# Patient Record
Sex: MALE | Race: WHITE | NOT HISPANIC OR LATINO | Employment: FULL TIME | URBAN - NONMETROPOLITAN AREA
[De-identification: names, ages, dates, MRNs, and addresses within clinical notes are randomized per-mention and may not be internally consistent; named-entity substitution may affect disease eponyms.]

---

## 2019-10-17 LAB
LEFT EYE DIABETIC RETINOPATHY: NORMAL
RIGHT EYE DIABETIC RETINOPATHY: NORMAL

## 2020-01-19 ENCOUNTER — APPOINTMENT (EMERGENCY)
Dept: RADIOLOGY | Facility: HOSPITAL | Age: 55
End: 2020-01-19
Payer: COMMERCIAL

## 2020-01-19 ENCOUNTER — HOSPITAL ENCOUNTER (EMERGENCY)
Facility: HOSPITAL | Age: 55
Discharge: HOME | End: 2020-01-19
Attending: EMERGENCY MEDICINE | Admitting: EMERGENCY MEDICINE
Payer: COMMERCIAL

## 2020-01-19 VITALS
DIASTOLIC BLOOD PRESSURE: 85 MMHG | OXYGEN SATURATION: 100 % | WEIGHT: 217.02 LBS | TEMPERATURE: 98.1 F | RESPIRATION RATE: 20 BRPM | HEART RATE: 93 BPM | SYSTOLIC BLOOD PRESSURE: 149 MMHG

## 2020-01-19 DIAGNOSIS — J32.9 SINUSITIS: ICD-10-CM

## 2020-01-19 DIAGNOSIS — R55 NEAR SYNCOPE: ICD-10-CM

## 2020-01-19 DIAGNOSIS — J40 BRONCHITIS: Primary | ICD-10-CM

## 2020-01-19 LAB
ALBUMIN SERPL BCP-MCNC: 3.8 G/DL (ref 3.5–5)
ALP SERPL-CCNC: 40 U/L (ref 46–116)
ALT SERPL W P-5'-P-CCNC: 70 U/L (ref 12–78)
ANION GAP SERPL CALCULATED.3IONS-SCNC: 11 MMOL/L (ref 4–13)
APTT PPP: 27 SECONDS (ref 23–37)
AST SERPL W P-5'-P-CCNC: 32 U/L (ref 5–45)
BASOPHILS # BLD AUTO: 0.07 THOUSANDS/ΜL (ref 0–0.1)
BASOPHILS NFR BLD AUTO: 1 % (ref 0–1)
BILIRUB SERPL-MCNC: 0.28 MG/DL (ref 0.2–1)
BUN SERPL-MCNC: 13 MG/DL (ref 5–25)
CALCIUM SERPL-MCNC: 8.8 MG/DL (ref 8.3–10.1)
CHLORIDE SERPL-SCNC: 102 MMOL/L (ref 100–108)
CO2 SERPL-SCNC: 26 MMOL/L (ref 21–32)
CREAT SERPL-MCNC: 0.85 MG/DL (ref 0.6–1.3)
EOSINOPHIL # BLD AUTO: 0.23 THOUSAND/ΜL (ref 0–0.61)
EOSINOPHIL NFR BLD AUTO: 3 % (ref 0–6)
ERYTHROCYTE [DISTWIDTH] IN BLOOD BY AUTOMATED COUNT: 12.7 % (ref 11.6–15.1)
GFR SERPL CREATININE-BSD FRML MDRD: 98 ML/MIN/1.73SQ M
GLUCOSE SERPL-MCNC: 156 MG/DL (ref 65–140)
HCT VFR BLD AUTO: 42.8 % (ref 36.5–49.3)
HGB BLD-MCNC: 14.3 G/DL (ref 12–17)
IMM GRANULOCYTES # BLD AUTO: 0.04 THOUSAND/UL (ref 0–0.2)
IMM GRANULOCYTES NFR BLD AUTO: 1 % (ref 0–2)
INR PPP: 0.94 (ref 0.84–1.19)
LYMPHOCYTES # BLD AUTO: 2.21 THOUSANDS/ΜL (ref 0.6–4.47)
LYMPHOCYTES NFR BLD AUTO: 29 % (ref 14–44)
MCH RBC QN AUTO: 28.7 PG (ref 26.8–34.3)
MCHC RBC AUTO-ENTMCNC: 33.4 G/DL (ref 31.4–37.4)
MCV RBC AUTO: 86 FL (ref 82–98)
MONOCYTES # BLD AUTO: 0.59 THOUSAND/ΜL (ref 0.17–1.22)
MONOCYTES NFR BLD AUTO: 8 % (ref 4–12)
NEUTROPHILS # BLD AUTO: 4.59 THOUSANDS/ΜL (ref 1.85–7.62)
NEUTS SEG NFR BLD AUTO: 58 % (ref 43–75)
NRBC BLD AUTO-RTO: 0 /100 WBCS
NT-PROBNP SERPL-MCNC: 18 PG/ML
PLATELET # BLD AUTO: 245 THOUSANDS/UL (ref 149–390)
PMV BLD AUTO: 9.1 FL (ref 8.9–12.7)
POTASSIUM SERPL-SCNC: 3.8 MMOL/L (ref 3.5–5.3)
PROT SERPL-MCNC: 7.4 G/DL (ref 6.4–8.2)
PROTHROMBIN TIME: 12.6 SECONDS (ref 11.6–14.5)
RBC # BLD AUTO: 4.98 MILLION/UL (ref 3.88–5.62)
SODIUM SERPL-SCNC: 139 MMOL/L (ref 136–145)
TROPONIN I SERPL-MCNC: <0.02 NG/ML
WBC # BLD AUTO: 7.73 THOUSAND/UL (ref 4.31–10.16)

## 2020-01-19 PROCEDURE — 80053 COMPREHEN METABOLIC PANEL: CPT | Performed by: EMERGENCY MEDICINE

## 2020-01-19 PROCEDURE — 83880 ASSAY OF NATRIURETIC PEPTIDE: CPT | Performed by: EMERGENCY MEDICINE

## 2020-01-19 PROCEDURE — 93005 ELECTROCARDIOGRAM TRACING: CPT

## 2020-01-19 PROCEDURE — 99285 EMERGENCY DEPT VISIT HI MDM: CPT | Performed by: EMERGENCY MEDICINE

## 2020-01-19 PROCEDURE — 85610 PROTHROMBIN TIME: CPT | Performed by: EMERGENCY MEDICINE

## 2020-01-19 PROCEDURE — 71046 X-RAY EXAM CHEST 2 VIEWS: CPT

## 2020-01-19 PROCEDURE — 94640 AIRWAY INHALATION TREATMENT: CPT

## 2020-01-19 PROCEDURE — 99284 EMERGENCY DEPT VISIT MOD MDM: CPT

## 2020-01-19 PROCEDURE — 85025 COMPLETE CBC W/AUTO DIFF WBC: CPT | Performed by: EMERGENCY MEDICINE

## 2020-01-19 PROCEDURE — 84484 ASSAY OF TROPONIN QUANT: CPT | Performed by: EMERGENCY MEDICINE

## 2020-01-19 PROCEDURE — 36415 COLL VENOUS BLD VENIPUNCTURE: CPT | Performed by: EMERGENCY MEDICINE

## 2020-01-19 PROCEDURE — 85730 THROMBOPLASTIN TIME PARTIAL: CPT | Performed by: EMERGENCY MEDICINE

## 2020-01-19 RX ORDER — PREDNISONE 20 MG/1
60 TABLET ORAL ONCE
Status: COMPLETED | OUTPATIENT
Start: 2020-01-19 | End: 2020-01-19

## 2020-01-19 RX ORDER — ALBUTEROL SULFATE 90 UG/1
2 AEROSOL, METERED RESPIRATORY (INHALATION) EVERY 4 HOURS PRN
Qty: 1 INHALER | Refills: 0 | Status: SHIPPED | OUTPATIENT
Start: 2020-01-19

## 2020-01-19 RX ORDER — ALBUTEROL SULFATE 2.5 MG/3ML
2.5 SOLUTION RESPIRATORY (INHALATION) ONCE
Status: COMPLETED | OUTPATIENT
Start: 2020-01-19 | End: 2020-01-19

## 2020-01-19 RX ADMIN — PREDNISONE 60 MG: 20 TABLET ORAL at 21:53

## 2020-01-19 RX ADMIN — ALBUTEROL SULFATE 2.5 MG: 2.5 SOLUTION RESPIRATORY (INHALATION) at 21:53

## 2020-01-20 NOTE — ED PROVIDER NOTES
History  Chief Complaint   Patient presents with    Cough     pt states he was coughing and had some arm spasms  +productive cough  pt was seen at [facility] last week and put on amoxicillin  denies PC, dizziness, N/V     Patient is a 77-year-old male presents the emergency department complaining of cough and congestion has been ongoing for about a week in worsening tonight he reports having a coughing fit in which he nearly passed out knocked over a cup of water complains of tightness in the chest associated with the cough now improving following this coughing fit that he had home prior to arrival tonight  Patient was recently treated after being seen for URI and sinus congestion symptoms a week ago placed on amoxicillin  History provided by:  Patient and relative  Cough   Cough characteristics:  Productive  Sputum characteristics:  Yellow  Severity:  Moderate  Onset quality:  Gradual  Duration:  1 week  Timing:  Constant  Progression:  Worsening  Chronicity:  New  Context: upper respiratory infection    Associated symptoms: rhinorrhea    Associated symptoms: no chest pain, no chills, no ear pain, no eye discharge, no fever, no headaches, no myalgias, no rash, no shortness of breath, no sore throat and no wheezing        None       Past Medical History:   Diagnosis Date    Diabetes mellitus ([facility] [address] )     Hypertension        Past Surgical History:   Procedure Laterality Date    HERNIA REPAIR      NASAL SEPTUM SURGERY         History reviewed  No pertinent family history  I have reviewed and agree with the history as documented  Social History     Tobacco Use    Smoking status: Former Smoker    Smokeless tobacco: Never Used   Substance Use Topics    Alcohol use: Never     Frequency: Never    Drug use: Never        Review of Systems   Constitutional: Negative for activity change, appetite change, chills, fatigue and fever  HENT: Positive for congestion, rhinorrhea, sinus pressure and sinus pain  Negative for ear pain and sore throat  Eyes: Negative for discharge, redness and visual disturbance  Respiratory: Positive for cough and chest tightness  Negative for shortness of breath and wheezing  Cardiovascular: Negative for chest pain and palpitations  Gastrointestinal: Negative for abdominal pain, constipation, diarrhea, nausea and vomiting  Endocrine: Negative for polydipsia and polyuria  Genitourinary: Negative for difficulty urinating, dysuria, frequency, hematuria and urgency  Musculoskeletal: Negative for arthralgias and myalgias  Skin: Negative for color change, pallor and rash  Neurological: Negative for dizziness, weakness, light-headedness, numbness and headaches  Hematological: Negative for adenopathy  Does not bruise/bleed easily  All other systems reviewed and are negative  Physical Exam  Physical Exam   Constitutional: He is oriented to person, place, and time  He appears well-developed and well-nourished  HENT:   Head: Normocephalic and atraumatic  Right Ear: External ear normal    Left Ear: External ear normal    Nose: Mucosal edema and rhinorrhea present  Right sinus exhibits maxillary sinus tenderness  Left sinus exhibits maxillary sinus tenderness  Mouth/Throat: Oropharynx is clear and moist    Eyes: Pupils are equal, round, and reactive to light  Conjunctivae and EOM are normal    Neck: Normal range of motion  Neck supple  Cardiovascular: Normal rate, regular rhythm, normal heart sounds and intact distal pulses  Pulmonary/Chest: Effort normal  No respiratory distress  He has wheezes  He has no rales  He exhibits no tenderness  Abdominal: Soft  Bowel sounds are normal  He exhibits no distension  There is no tenderness  There is no guarding  Musculoskeletal: Normal range of motion  Neurological: He is alert and oriented to person, place, and time  No cranial nerve deficit or sensory deficit  Skin: Skin is warm and dry     Psychiatric: He has a normal mood and affect  Nursing note and vitals reviewed        Vital Signs  ED Triage Vitals [01/19/20 2141]   Temperature Pulse Respirations Blood Pressure SpO2   98 1 °F (36 7 °C) 84 16 146/91 95 %      Temp Source Heart Rate Source Patient Position - Orthostatic VS BP Location FiO2 (%)   Oral Monitor Sitting Right arm --      Pain Score       No Pain           Vitals:    01/19/20 2141   BP: 146/91   Pulse: 84   Patient Position - Orthostatic VS: Sitting         Visual Acuity  Visual Acuity      Most Recent Value   L Pupil Size (mm)  3   R Pupil Size (mm)  3          ED Medications  Medications   predniSONE tablet 60 mg (60 mg Oral Given 1/19/20 2153)   albuterol inhalation solution 2 5 mg (2 5 mg Nebulization Given 1/19/20 2153)       Diagnostic Studies  Results Reviewed     Procedure Component Value Units Date/Time    Comprehensive metabolic panel [[id number]]  (Abnormal) Collected:  01/19/20 2150    Lab Status:  Final result Specimen:  Blood from Arm, Left Updated:  01/19/20 2219     Sodium 139 mmol/L      Potassium 3 8 mmol/L      Chloride 102 mmol/L      CO2 26 mmol/L      ANION GAP 11 mmol/L      BUN 13 mg/dL      Creatinine 0 85 mg/dL      Glucose 156 mg/dL      Calcium 8 8 mg/dL      AST 32 U/L      ALT 70 U/L      Alkaline Phosphatase 40 U/L      Total Protein 7 4 g/dL      Albumin 3 8 g/dL      Total Bilirubin 0 28 mg/dL      eGFR 98 ml/min/1 73sq m     Narrative:       Adrianna guidelines for Chronic Kidney Disease (CKD):     Stage 1 with normal or high GFR (GFR > 90 mL/min/1 73 square meters)    Stage 2 Mild CKD (GFR = 60-89 mL/min/1 73 square meters)    Stage 3A Moderate CKD (GFR = 45-59 mL/min/1 73 square meters)    Stage 3B Moderate CKD (GFR = 30-44 mL/min/1 73 square meters)    Stage 4 Severe CKD (GFR = 15-29 mL/min/1 73 square meters)    Stage 5 End Stage CKD (GFR <15 mL/min/1 73 square meters)  Note: GFR calculation is accurate only with a steady state creatinine NT-BNP PRO [[id number]]  (Normal) Collected:  01/19/20 2150    Lab Status:  Final result Specimen:  Blood from Arm, Left Updated:  01/19/20 2219     NT-proBNP 18 pg/mL     Troponin I [[id number]]  (Normal) Collected:  01/19/20 2150    Lab Status:  Final result Specimen:  Blood from Arm, Left Updated:  01/19/20 2216     Troponin I <0 02 ng/mL     Protime-INR [[id number]]  (Normal) Collected:  01/19/20 2150    Lab Status:  Final result Specimen:  Blood from Arm, Left Updated:  01/19/20 2207     Protime 12 6 seconds      INR 0 94    APTT [[id number]]  (Normal) Collected:  01/19/20 2150    Lab Status:  Final result Specimen:  Blood from Arm, Left Updated:  01/19/20 2207     PTT 27 seconds     CBC and differential [[id number]] Collected:  01/19/20 2150    Lab Status:  Final result Specimen:  Blood from Arm, Left Updated:  01/19/20 2155     WBC 7 73 Thousand/uL      RBC 4 98 Million/uL      Hemoglobin 14 3 g/dL      Hematocrit 42 8 %      MCV 86 fL      MCH 28 7 pg      MCHC 33 4 g/dL      RDW 12 7 %      MPV 9 1 fL      Platelets 230 Thousands/uL      nRBC 0 /100 WBCs      Neutrophils Relative 58 %      Immat GRANS % 1 %      Lymphocytes Relative 29 %      Monocytes Relative 8 %      Eosinophils Relative 3 %      Basophils Relative 1 %      Neutrophils Absolute 4 59 Thousands/µL      Immature Grans Absolute 0 04 Thousand/uL      Lymphocytes Absolute 2 21 Thousands/µL      Monocytes Absolute 0 59 Thousand/µL      Eosinophils Absolute 0 23 Thousand/µL      Basophils Absolute 0 07 Thousands/µL                  XR chest 2 views   ED Interpretation by Ludmila Lindsey DO (01/19 2222)   No acute disease                 Procedures  ECG 12 Lead Documentation Only  Date/Time: 1/19/2020 9:53 PM  Performed by: Ludmila Lindsey DO  Authorized by:  Ludmila Lindsey DO     ECG reviewed by me, the ED Provider: yes    Patient location:  ED  Quality:     Tracing quality:  Limited by artifact  Rate:     ECG rate:  87    ECG rate assessment: normal    Rhythm:     Rhythm: sinus rhythm    QRS:     QRS axis:  Normal  Conduction:     Conduction: normal    ST segments:     ST segments:  Normal  T waves:     T waves: normal               ED Course                               MDM  Number of Diagnoses or Management Options  Bronchitis: new and requires workup  Near syncope: new and requires workup  Sinusitis: new and requires workup  Diagnosis management comments: Patient remained clinically and hemodynamically stable in the emergency department normal sinus rhythm on telemetry he is afebrile nontoxic well-appearing neurologically intact with nonfocal neurologic examination lungs grossly clear on chest x-ray and auscultation with very mild wheezing suspect related to bronchitis and bronchospasm  No hypoxia respiratory distress or difficulty breathing at rest on room air  Treated with nebulizer treatment and steroids in the ED remained stable and feels improved will hold on steroid course given history of diabetes and will treat with albuterol as needed and advised to continue the amoxicillin he has been prescribed for sinusitis which will also cover for his bronchitis advised prompt follow-up with PCP for re-evaluation return precautions and anticipatory guidance discussed           Amount and/or Complexity of Data Reviewed  Clinical lab tests: reviewed and ordered  Tests in the radiology section of CPT®: ordered and reviewed  Tests in the medicine section of CPT®: ordered and reviewed  Decide to obtain previous medical records or to obtain history from someone other than the patient: yes  Review and summarize past medical records: yes  Independent visualization of images, tracings, or specimens: yes    Risk of Complications, Morbidity, and/or Mortality  Presenting problems: moderate  Diagnostic procedures: moderate  Management options: moderate    Patient Progress  Patient progress: stable        Disposition  Final diagnoses:   Bronchitis   Sinusitis   Near syncope     Time reflects when diagnosis was documented in both MDM as applicable and the Disposition within this note     Time User Action Codes Description Comment    1/19/2020 10:21 PM Duard Yumiko Add [J40] Bronchitis     1/19/2020 10:21 PM Randjoesph [place] [J32 9] Sinusitis     1/19/2020 10:21 PM Duard Yumiko Add [R55] Near syncope       ED Disposition     ED Disposition Condition Date/Time Comment    Discharge Stable Sun Jan 19, 2020 10:22 PM [facility] discharge to home/self care  Follow-up Information     Follow up With Specialties Details Why Contact Info    Raisa Caldera DO Family Medicine Schedule an appointment as soon as possible for a visit in 3 days  [address] [place] [address]  [phone]            Patient's Medications   Discharge Prescriptions    ALBUTEROL (PROVENTIL HFA,VENTOLIN HFA) 90 MCG/ACT INHALER    Inhale 2 puffs every 4 (four) hours as needed for wheezing       Start Date: 1/19/2020 End Date: --       Order Dose: 2 puffs       Quantity: 1 Inhaler    Refills: 0     No discharge procedures on file      ED Provider  Electronically Signed by           Bradly Cogan, DO  01/19/20 5853

## 2020-01-20 NOTE — ED NOTES
Pt in no acute distress  Ambulates with a steady gait   Breathes without difficulty     Jun Davey RN  01/19/20 8958

## 2020-01-21 LAB
ATRIAL RATE: 87 BPM
P AXIS: 24 DEGREES
PR INTERVAL: 146 MS
QRS AXIS: 76 DEGREES
QRSD INTERVAL: 86 MS
QT INTERVAL: 360 MS
QTC INTERVAL: 433 MS
T WAVE AXIS: 57 DEGREES
VENTRICULAR RATE: 87 BPM

## 2020-01-21 PROCEDURE — 93010 ELECTROCARDIOGRAM REPORT: CPT | Performed by: INTERNAL MEDICINE

## 2023-08-29 ENCOUNTER — TELEPHONE (OUTPATIENT)
Dept: NEPHROLOGY | Facility: CLINIC | Age: 58
End: 2023-08-29

## 2023-08-30 ENCOUNTER — APPOINTMENT (OUTPATIENT)
Dept: LAB | Facility: HOSPITAL | Age: 58
End: 2023-08-30
Payer: COMMERCIAL

## 2023-08-30 DIAGNOSIS — R80.9 PROTEINURIA, UNSPECIFIED TYPE: ICD-10-CM

## 2023-08-30 DIAGNOSIS — R80.9 PROTEINURIA, UNSPECIFIED TYPE: Primary | ICD-10-CM

## 2023-08-30 LAB
ANION GAP SERPL CALCULATED.3IONS-SCNC: 8 MMOL/L
BACTERIA UR QL AUTO: ABNORMAL /HPF
BASOPHILS # BLD AUTO: 0.05 THOUSANDS/ÂΜL (ref 0–0.1)
BASOPHILS NFR BLD AUTO: 1 % (ref 0–1)
BILIRUB UR QL STRIP: ABNORMAL
BUN SERPL-MCNC: 20 MG/DL (ref 5–25)
CALCIUM SERPL-MCNC: 9.7 MG/DL (ref 8.4–10.2)
CHLORIDE SERPL-SCNC: 101 MMOL/L (ref 96–108)
CLARITY UR: CLEAR
CO2 SERPL-SCNC: 29 MMOL/L (ref 21–32)
COLOR UR: YELLOW
CREAT SERPL-MCNC: 0.88 MG/DL (ref 0.6–1.3)
CREAT UR-MCNC: 162.2 MG/DL
CREAT UR-MCNC: 163.6 MG/DL
EOSINOPHIL # BLD AUTO: 0.14 THOUSAND/ÂΜL (ref 0–0.61)
EOSINOPHIL NFR BLD AUTO: 2 % (ref 0–6)
ERYTHROCYTE [DISTWIDTH] IN BLOOD BY AUTOMATED COUNT: 13.3 % (ref 11.6–15.1)
GFR SERPL CREATININE-BSD FRML MDRD: 94 ML/MIN/1.73SQ M
GLUCOSE P FAST SERPL-MCNC: 75 MG/DL (ref 65–99)
GLUCOSE UR STRIP-MCNC: NEGATIVE MG/DL
HCT VFR BLD AUTO: 44.5 % (ref 36.5–49.3)
HGB BLD-MCNC: 14.7 G/DL (ref 12–17)
HGB UR QL STRIP.AUTO: NEGATIVE
IMM GRANULOCYTES # BLD AUTO: 0.05 THOUSAND/UL (ref 0–0.2)
IMM GRANULOCYTES NFR BLD AUTO: 1 % (ref 0–2)
KETONES UR STRIP-MCNC: NEGATIVE MG/DL
LEUKOCYTE ESTERASE UR QL STRIP: NEGATIVE
LYMPHOCYTES # BLD AUTO: 2.4 THOUSANDS/ÂΜL (ref 0.6–4.47)
LYMPHOCYTES NFR BLD AUTO: 32 % (ref 14–44)
MCH RBC QN AUTO: 29.6 PG (ref 26.8–34.3)
MCHC RBC AUTO-ENTMCNC: 33 G/DL (ref 31.4–37.4)
MCV RBC AUTO: 90 FL (ref 82–98)
MICROALBUMIN UR-MCNC: 324.4 MG/L
MICROALBUMIN/CREAT 24H UR: 200 MG/G CREATININE (ref 0–30)
MONOCYTES # BLD AUTO: 0.61 THOUSAND/ÂΜL (ref 0.17–1.22)
MONOCYTES NFR BLD AUTO: 8 % (ref 4–12)
NEUTROPHILS # BLD AUTO: 4.18 THOUSANDS/ÂΜL (ref 1.85–7.62)
NEUTS SEG NFR BLD AUTO: 56 % (ref 43–75)
NITRITE UR QL STRIP: NEGATIVE
NON-SQ EPI CELLS URNS QL MICRO: ABNORMAL /HPF
NRBC BLD AUTO-RTO: 0 /100 WBCS
PH UR STRIP.AUTO: 5.5 [PH]
PLATELET # BLD AUTO: 234 THOUSANDS/UL (ref 149–390)
PMV BLD AUTO: 9.3 FL (ref 8.9–12.7)
POTASSIUM SERPL-SCNC: 4.5 MMOL/L (ref 3.5–5.3)
PROT UR STRIP-MCNC: ABNORMAL MG/DL
PROT UR-MCNC: 49 MG/DL
PROT/CREAT UR: 0.3 MG/G{CREAT} (ref 0–0.1)
RBC # BLD AUTO: 4.96 MILLION/UL (ref 3.88–5.62)
RBC #/AREA URNS AUTO: ABNORMAL /HPF
SODIUM SERPL-SCNC: 138 MMOL/L (ref 135–147)
SP GR UR STRIP.AUTO: >=1.03 (ref 1–1.03)
UROBILINOGEN UR QL STRIP.AUTO: 0.2 E.U./DL
WBC # BLD AUTO: 7.43 THOUSAND/UL (ref 4.31–10.16)
WBC #/AREA URNS AUTO: ABNORMAL /HPF

## 2023-08-30 PROCEDURE — 84156 ASSAY OF PROTEIN URINE: CPT

## 2023-08-30 PROCEDURE — 82043 UR ALBUMIN QUANTITATIVE: CPT

## 2023-08-30 PROCEDURE — 82570 ASSAY OF URINE CREATININE: CPT

## 2023-08-30 PROCEDURE — 81001 URINALYSIS AUTO W/SCOPE: CPT

## 2023-08-30 PROCEDURE — 85025 COMPLETE CBC W/AUTO DIFF WBC: CPT

## 2023-08-30 PROCEDURE — 36415 COLL VENOUS BLD VENIPUNCTURE: CPT

## 2023-08-30 PROCEDURE — 80048 BASIC METABOLIC PNL TOTAL CA: CPT

## 2023-09-01 ENCOUNTER — CONSULT (OUTPATIENT)
Dept: NEPHROLOGY | Facility: CLINIC | Age: 58
End: 2023-09-01
Payer: COMMERCIAL

## 2023-09-01 VITALS — DIASTOLIC BLOOD PRESSURE: 60 MMHG | WEIGHT: 225 LBS | HEART RATE: 75 BPM | SYSTOLIC BLOOD PRESSURE: 100 MMHG

## 2023-09-01 DIAGNOSIS — R80.9 DIABETES MELLITUS WITH PROTEINURIA: ICD-10-CM

## 2023-09-01 DIAGNOSIS — N18.2 CKD (CHRONIC KIDNEY DISEASE) STAGE 2, GFR 60-89 ML/MIN: Primary | ICD-10-CM

## 2023-09-01 DIAGNOSIS — E11.29 DIABETES MELLITUS WITH PROTEINURIA: ICD-10-CM

## 2023-09-01 DIAGNOSIS — R80.9 PROTEINURIA, UNSPECIFIED TYPE: ICD-10-CM

## 2023-09-01 DIAGNOSIS — E87.3 ALKALOSIS: ICD-10-CM

## 2023-09-01 PROCEDURE — 99244 OFF/OP CNSLTJ NEW/EST MOD 40: CPT | Performed by: INTERNAL MEDICINE

## 2023-09-01 RX ORDER — GLIPIZIDE 10 MG/1
10 TABLET, FILM COATED, EXTENDED RELEASE ORAL DAILY
COMMUNITY

## 2023-09-01 RX ORDER — PIOGLITAZONEHYDROCHLORIDE 30 MG/1
30 TABLET ORAL DAILY
COMMUNITY

## 2023-09-01 RX ORDER — MONTELUKAST SODIUM 10 MG/1
10 TABLET ORAL
COMMUNITY
End: 2023-09-01

## 2023-09-01 RX ORDER — SIMVASTATIN 20 MG
20 TABLET ORAL
COMMUNITY

## 2023-09-01 RX ORDER — LISINOPRIL 20 MG/1
20 TABLET ORAL DAILY
COMMUNITY

## 2023-09-01 RX ORDER — PHENOL 1.4 %
10 AEROSOL, SPRAY (ML) MUCOUS MEMBRANE
COMMUNITY

## 2023-09-01 RX ORDER — TETRACYCLINE HCL 500 MG
450 CAPSULE ORAL
COMMUNITY

## 2023-09-01 RX ORDER — MULTIVIT-MIN/IRON FUM/FOLIC AC 7.5 MG-4
1 TABLET ORAL DAILY
COMMUNITY

## 2023-09-01 RX ORDER — AMOXICILLIN 500 MG
1200 CAPSULE ORAL
COMMUNITY

## 2023-09-01 NOTE — PROGRESS NOTES
[facility] [address] [place] [address]    Name: Caitlyn Morales  YOB: 1965      Assessment/Plan:  Proteinuria  Most likely due to diabetic nephropathy. We will rule out monoclonal gammopathy with a serum electrophoresis with additional work-up/evaluation as indicated according to lab results. Continue with ACE inhibitor at this time, consider more aggressive medical management. Patient progresses clinical standpoint. Chronic disease stage II  Kidney function currently is very good, continue to try to optimize medical management of diabetes mellitus type 2 as well as hypertension. Hypertension in the setting of chronic kidney disease stage II  Continue current medications, blood pressures well controlled, continue to encourage low sodium diet. Diabetes mellitus type 2  Patient most likely has diabetic nephropathy, rule out monoclonal gammopathy as noted above. Continue to address dietary challenges in conforming to a goal diet of low carbohydrate high-protein. We will modify or recommend modification of medications progresses clinically. BMI 37  Patient encouraged to a strategies toward adding to a more healthy weight. This will include ductions and restrictions as well as increasing physical activity to hopefully include exercise.           Problem List Items Addressed This Visit          Endocrine    Diabetes mellitus with proteinuria (HCC)    Relevant Medications    pioglitazone (ACTOS) 30 mg tablet    glipiZIDE (GLUCOTROL XL) 10 mg 24 hr tablet    metFORMIN (GLUCOPHAGE) 1000 MG tablet       Genitourinary    CKD (chronic kidney disease) stage 2, GFR 60-89 ml/min - Primary    Relevant Orders    Albumin / creatinine urine ratio    Urinalysis with microscopic    Comprehensive metabolic panel    Magnesium    Phosphorus    Vitamin D 25 hydroxy       Other    Proteinuria    Relevant Orders    Albumin / creatinine urine ratio    Urinalysis with microscopic Comprehensive metabolic panel    Magnesium    Phosphorus    Protein electrophoresis, urine    Protein electrophoresis, serum    Vitamin D 25 hydroxy    Alkalosis       Thank you for allowing us to participate in the care of your patient. Check labs as noted above, we will see him back for regular appointment in 6 months, sooner if clinically indicated. Subjective:      Patient ID: Marixa Aviles is a 62 y.o. male. Patient presents for initial evaluation. We reviewed the patient's labs in detail, creatinine noted to be 0.80 mg/dL, with no significant electrolyte abnormalities. Patient noted to have a microalbumin/creatinine of around 200 mg/gr. He has a history of diabetes mellitus type 2. Patient has smoked in the past, but at this point has been without tobacco use use. He is taking medications as prescribed and no specific side effects at this time. Hypertension  This is a chronic problem. The current episode started more than 1 year ago. The problem is unchanged. The problem is controlled. Pertinent negatives include no chest pain, orthopnea or peripheral edema. There are no associated agents to hypertension. Risk factors for coronary artery disease include diabetes mellitus, male gender and obesity. Past treatments include lifestyle changes, diuretics and ACE inhibitors. Compliance problems include diet. There is no history of kidney disease or heart failure. There is no history of chronic renal disease. The following portions of the patient's history were reviewed and updated as appropriate: allergies, current medications, past family history, past medical history, past social history, past surgical history and problem list.    Review of Systems   Cardiovascular:  Negative for chest pain and orthopnea.          Social History     Socioeconomic History    Marital status: /Civil Union     Spouse name: None    Number of children: None    Years of education: None    Highest education level: None   Occupational History    None   Tobacco Use    Smoking status: Former    Smokeless tobacco: Never   Substance and Sexual Activity    Alcohol use: Never    Drug use: Never    Sexual activity: None   Other Topics Concern    None   Social History Narrative    None     Social Determinants of Health     Financial Resource Strain: Not on file   Food Insecurity: Not on file   Transportation Needs: Not on file   Physical Activity: Not on file   Stress: Not on file   Social Connections: Not on file   Intimate Partner Violence: Not on file   Housing Stability: Not on file     Past Medical History:   Diagnosis Date    Diabetes mellitus ([address])     Hypertension      Past Surgical History:   Procedure Laterality Date    HERNIA REPAIR      NASAL SEPTUM SURGERY         Current Outpatient Medications:     Apple Cider Vinegar 500 MG TABS, Take 450 mg by mouth, Disp: , Rfl:     Garlic 9427 MG TBEC, Take 2,000 mg by mouth, Disp: , Rfl:     glipiZIDE (GLUCOTROL XL) 10 mg 24 hr tablet, Take 10 mg by mouth daily, Disp: , Rfl:     lisinopril (ZESTRIL) 20 mg tablet, Take 20 mg by mouth daily, Disp: , Rfl:     Melatonin 10 MG TABS, Take 10 mg by mouth, Disp: , Rfl:     metFORMIN (GLUCOPHAGE) 1000 MG tablet, Take 1,000 mg by mouth 2 (two) times a day with meals, Disp: , Rfl:     Multiple Vitamins-Minerals (multivitamin with minerals) tablet, Take 1 tablet by mouth daily, Disp: , Rfl:     Omega-3 Fatty Acids (Fish Oil) 1200 MG CAPS, Take 1,200 mg by mouth, Disp: , Rfl:     pioglitazone (ACTOS) 30 mg tablet, Take 30 mg by mouth daily, Disp: , Rfl:     simvastatin (ZOCOR) 20 mg tablet, Take 20 mg by mouth daily at bedtime, Disp: , Rfl:     Lab Results   Component Value Date    SODIUM 138 08/30/2023    K 4.5 08/30/2023     08/30/2023    CO2 29 08/30/2023    AGAP 8 08/30/2023    BUN 20 08/30/2023    CREATININE 0.88 08/30/2023    GLUC 156 (H) 01/19/2020    GLUF 75 08/30/2023    CALCIUM 9.7 08/30/2023    AST 32 01/19/2020 ALT 70 01/19/2020    ALKPHOS 40 (L) 01/19/2020    TP 7.4 01/19/2020    TBILI 0.28 01/19/2020    EGFR 94 08/30/2023     Lab Results   Component Value Date    WBC 7.43 08/30/2023    HGB 14.7 08/30/2023    HCT 44.5 08/30/2023    MCV 90 08/30/2023     08/30/2023     No results found for: "CHOLESTEROL"  No results found for: "HDL"  No results found for: "LDLCALC"  No results found for: "TRIG"  No results found for: "CHOLHDL"  No results found for: "[id number]", "TSH"  Lab Results   Component Value Date    CALCIUM 9.7 08/30/2023     No results found for: "SPEP", "UPEP"  No results found for: "Rhonda [place]", "[id number]"        Objective:      /60 (BP Location: Left arm, Patient Position: Sitting, Cuff Size: Large)   Pulse 75   Wt 102 kg (225 lb)          Physical Exam

## 2023-09-18 ENCOUNTER — APPOINTMENT (OUTPATIENT)
Dept: LAB | Facility: HOSPITAL | Age: 58
End: 2023-09-18
Payer: COMMERCIAL

## 2023-09-18 DIAGNOSIS — N18.2 CKD (CHRONIC KIDNEY DISEASE) STAGE 2, GFR 60-89 ML/MIN: ICD-10-CM

## 2023-09-18 DIAGNOSIS — R80.9 PROTEINURIA, UNSPECIFIED TYPE: ICD-10-CM

## 2023-09-18 LAB
25(OH)D3 SERPL-MCNC: 22.6 NG/ML (ref 30–100)
ALBUMIN SERPL BCP-MCNC: 4.6 G/DL (ref 3.5–5)
ALP SERPL-CCNC: 26 U/L (ref 34–104)
ALT SERPL W P-5'-P-CCNC: 55 U/L (ref 7–52)
ANION GAP SERPL CALCULATED.3IONS-SCNC: 7 MMOL/L
AST SERPL W P-5'-P-CCNC: 32 U/L (ref 13–39)
BACTERIA UR QL AUTO: ABNORMAL /HPF
BILIRUB SERPL-MCNC: 0.5 MG/DL (ref 0.2–1)
BILIRUB UR QL STRIP: NEGATIVE
BUN SERPL-MCNC: 17 MG/DL (ref 5–25)
CALCIUM SERPL-MCNC: 9.8 MG/DL (ref 8.4–10.2)
CHLORIDE SERPL-SCNC: 103 MMOL/L (ref 96–108)
CLARITY UR: CLEAR
CO2 SERPL-SCNC: 28 MMOL/L (ref 21–32)
COLOR UR: YELLOW
CREAT SERPL-MCNC: 0.93 MG/DL (ref 0.6–1.3)
CREAT UR-MCNC: 118.8 MG/DL
GFR SERPL CREATININE-BSD FRML MDRD: 90 ML/MIN/1.73SQ M
GLUCOSE P FAST SERPL-MCNC: 107 MG/DL (ref 65–99)
GLUCOSE UR STRIP-MCNC: NEGATIVE MG/DL
HGB UR QL STRIP.AUTO: NEGATIVE
KETONES UR STRIP-MCNC: NEGATIVE MG/DL
LEUKOCYTE ESTERASE UR QL STRIP: NEGATIVE
MAGNESIUM SERPL-MCNC: 2 MG/DL (ref 1.9–2.7)
MICROALBUMIN UR-MCNC: 168.5 MG/L
MICROALBUMIN/CREAT 24H UR: 142 MG/G CREATININE (ref 0–30)
NITRITE UR QL STRIP: NEGATIVE
NON-SQ EPI CELLS URNS QL MICRO: ABNORMAL /HPF
PH UR STRIP.AUTO: 5.5 [PH]
PHOSPHATE SERPL-MCNC: 4.5 MG/DL (ref 2.7–4.5)
POTASSIUM SERPL-SCNC: 4.1 MMOL/L (ref 3.5–5.3)
PROT SERPL-MCNC: 7.4 G/DL (ref 6.4–8.4)
PROT UR STRIP-MCNC: ABNORMAL MG/DL
RBC #/AREA URNS AUTO: ABNORMAL /HPF
SODIUM SERPL-SCNC: 138 MMOL/L (ref 135–147)
SP GR UR STRIP.AUTO: >=1.03 (ref 1–1.03)
UROBILINOGEN UR QL STRIP.AUTO: 0.2 E.U./DL
WBC #/AREA URNS AUTO: ABNORMAL /HPF

## 2023-09-18 PROCEDURE — 84100 ASSAY OF PHOSPHORUS: CPT

## 2023-09-18 PROCEDURE — 84166 PROTEIN E-PHORESIS/URINE/CSF: CPT

## 2023-09-18 PROCEDURE — 82306 VITAMIN D 25 HYDROXY: CPT

## 2023-09-18 PROCEDURE — 82570 ASSAY OF URINE CREATININE: CPT

## 2023-09-18 PROCEDURE — 81001 URINALYSIS AUTO W/SCOPE: CPT

## 2023-09-18 PROCEDURE — 83735 ASSAY OF MAGNESIUM: CPT

## 2023-09-18 PROCEDURE — 80053 COMPREHEN METABOLIC PANEL: CPT

## 2023-09-18 PROCEDURE — 36415 COLL VENOUS BLD VENIPUNCTURE: CPT

## 2023-09-18 PROCEDURE — 82043 UR ALBUMIN QUANTITATIVE: CPT

## 2023-09-19 ENCOUNTER — APPOINTMENT (OUTPATIENT)
Dept: LAB | Facility: HOSPITAL | Age: 58
End: 2023-09-19
Payer: COMMERCIAL

## 2023-09-19 DIAGNOSIS — R80.9 PROTEINURIA, UNSPECIFIED TYPE: ICD-10-CM

## 2023-09-19 PROCEDURE — 84165 PROTEIN E-PHORESIS SERUM: CPT

## 2023-09-19 PROCEDURE — 36415 COLL VENOUS BLD VENIPUNCTURE: CPT

## 2023-09-20 LAB
ALBUMIN UR ELPH-MCNC: 100 %
ALPHA1 GLOB MFR UR ELPH: 0 %
ALPHA2 GLOB MFR UR ELPH: 0 %
B-GLOBULIN MFR UR ELPH: 0 %
GAMMA GLOB MFR UR ELPH: 0 %
PROT PATTERN UR ELPH-IMP: NORMAL
PROT UR-MCNC: 27.3 MG/DL

## 2023-09-20 PROCEDURE — 84166 PROTEIN E-PHORESIS/URINE/CSF: CPT | Performed by: STUDENT IN AN ORGANIZED HEALTH CARE EDUCATION/TRAINING PROGRAM

## 2023-09-21 LAB
ALBUMIN SERPL ELPH-MCNC: 4.42 G/DL (ref 3.2–5.1)
ALBUMIN SERPL ELPH-MCNC: 65 % (ref 48–70)
ALPHA1 GLOB SERPL ELPH-MCNC: 0.24 G/DL (ref 0.15–0.47)
ALPHA1 GLOB SERPL ELPH-MCNC: 3.6 % (ref 1.8–7)
ALPHA2 GLOB SERPL ELPH-MCNC: 0.65 G/DL (ref 0.42–1.04)
ALPHA2 GLOB SERPL ELPH-MCNC: 9.6 % (ref 5.9–14.9)
BETA GLOB ABNORMAL SERPL ELPH-MCNC: 0.46 G/DL (ref 0.31–0.57)
BETA1 GLOB SERPL ELPH-MCNC: 6.7 % (ref 4.7–7.7)
BETA2 GLOB SERPL ELPH-MCNC: 4.5 % (ref 3.1–7.9)
BETA2+GAMMA GLOB SERPL ELPH-MCNC: 0.31 G/DL (ref 0.2–0.58)
GAMMA GLOB ABNORMAL SERPL ELPH-MCNC: 0.72 G/DL (ref 0.4–1.66)
GAMMA GLOB SERPL ELPH-MCNC: 10.6 % (ref 6.9–22.3)
IGG/ALB SER: 1.86 {RATIO} (ref 1.1–1.8)
PROT PATTERN SERPL ELPH-IMP: ABNORMAL
PROT SERPL-MCNC: 6.8 G/DL (ref 6.4–8.4)

## 2023-09-21 PROCEDURE — 84165 PROTEIN E-PHORESIS SERUM: CPT | Performed by: STUDENT IN AN ORGANIZED HEALTH CARE EDUCATION/TRAINING PROGRAM

## 2023-10-24 ENCOUNTER — OFFICE VISIT (OUTPATIENT)
Dept: URGENT CARE | Facility: CLINIC | Age: 58
End: 2023-10-24
Payer: COMMERCIAL

## 2023-10-24 VITALS
WEIGHT: 225 LBS | HEART RATE: 90 BPM | SYSTOLIC BLOOD PRESSURE: 118 MMHG | HEIGHT: 65 IN | RESPIRATION RATE: 19 BRPM | OXYGEN SATURATION: 94 % | DIASTOLIC BLOOD PRESSURE: 62 MMHG | TEMPERATURE: 96 F | BODY MASS INDEX: 37.49 KG/M2

## 2023-10-24 DIAGNOSIS — J06.9 VIRAL UPPER RESPIRATORY TRACT INFECTION: Primary | ICD-10-CM

## 2023-10-24 LAB
SARS-COV-2 AG UPPER RESP QL IA: NEGATIVE
VALID CONTROL: NORMAL

## 2023-10-24 PROCEDURE — 99203 OFFICE O/P NEW LOW 30 MIN: CPT

## 2023-10-24 PROCEDURE — 87811 SARS-COV-2 COVID19 W/OPTIC: CPT

## 2023-10-24 RX ORDER — AZITHROMYCIN 250 MG/1
TABLET, FILM COATED ORAL
Qty: 6 TABLET | Refills: 0 | Status: SHIPPED | OUTPATIENT
Start: 2023-10-24 | End: 2023-10-28

## 2023-10-24 NOTE — PROGRESS NOTES
[facility]        NAME: Aaliyah Quick is a 62 y.o. male  : 1965    MRN: [id number]  DATE: 2023  TIME: 2:43 PM    Assessment and Plan   Viral upper respiratory tract infection [J06.9]  1. Viral upper respiratory tract infection  Poct Covid 19 Rapid Antigen Test    azithromycin (ZITHROMAX) 250 mg tablet        Discussed problem with patient and his wife. COVID performed today was negative. Discussed viral illnesses and discussed that this is most likely not sinusitis but viral URI. Wife was insistent on Z-Steve as previously prescribed by PCP as "this happens every year". Discussed that Z-Steve will most likely not treat symptoms but more so recommended DayQuil, NyQuil, Mucinex, Flonase, nasal decongestants. Humidifier in the bedroom at night. Follow-up with PCP if symptoms not improving report to the ER symptoms worsen. Patient Instructions       Follow up with PCP in 3-5 days. Proceed to  ER if symptoms worsen. Chief Complaint     Chief Complaint   Patient presents with   • Sinusitis     C/o nasal and chest congestion. Onset yesterday. History of Present Illness       C/o nasal and chest congestion. Onset yesterday. Seasonal allergies. Just Nyquil, moderate relief. States he gets these symptoms every year. No COVID test.  Denies any fevers or chills and is eating and drinking normally. Minor associated cough. Reports headaches associated with sinus pressure. Sinusitis  Associated symptoms include congestion, coughing, headaches, sinus pressure and a sore throat. Pertinent negatives include no chills, ear pain or shortness of breath. Review of Systems   Review of Systems   Constitutional:  Positive for fatigue. Negative for appetite change, chills and fever (no tylenol or motrin). HENT:  Positive for congestion, sinus pressure, sinus pain and sore throat. Negative for ear pain, postnasal drip and rhinorrhea. Respiratory:  Positive for cough.  Negative for chest tightness, shortness of breath, wheezing and stridor. Cardiovascular:  Negative for chest pain and palpitations. Gastrointestinal:  Negative for abdominal pain, constipation, diarrhea, nausea and vomiting. Musculoskeletal:  Negative for myalgias. Neurological:  Positive for headaches. Negative for dizziness, syncope and light-headedness. Current Medications       Current Outpatient Medications:   •  Apple Cider Vinegar 500 MG TABS, Take 450 mg by mouth, Disp: , Rfl:   •  azithromycin (ZITHROMAX) 250 mg tablet, Take 2 tablets today then 1 tablet daily x 4 days, Disp: 6 tablet, Rfl: 0  •  Garlic 1151 MG TBEC, Take 2,000 mg by mouth, Disp: , Rfl:   •  glipiZIDE (GLUCOTROL XL) 10 mg 24 hr tablet, Take 10 mg by mouth daily, Disp: , Rfl:   •  lisinopril (ZESTRIL) 20 mg tablet, Take 20 mg by mouth daily, Disp: , Rfl:   •  Melatonin 10 MG TABS, Take 10 mg by mouth, Disp: , Rfl:   •  metFORMIN (GLUCOPHAGE) 1000 MG tablet, Take 1,000 mg by mouth 2 (two) times a day with meals, Disp: , Rfl:   •  Multiple Vitamins-Minerals (multivitamin with minerals) tablet, Take 1 tablet by mouth daily, Disp: , Rfl:   •  Omega-3 Fatty Acids (Fish Oil) 1200 MG CAPS, Take 1,200 mg by mouth, Disp: , Rfl:   •  pioglitazone (ACTOS) 30 mg tablet, Take 30 mg by mouth daily, Disp: , Rfl:   •  simvastatin (ZOCOR) 20 mg tablet, Take 20 mg by mouth daily at bedtime, Disp: , Rfl:     Current Allergies     Allergies as of 10/24/2023   • (No Known Allergies)            The following portions of the patient's history were reviewed and updated as appropriate: allergies, current medications, past family history, past medical history, past social history, past surgical history and problem list.     Past Medical History:   Diagnosis Date   • Diabetes mellitus ([address])    • Hypertension        Past Surgical History:   Procedure Laterality Date   • HERNIA REPAIR     • NASAL SEPTUM SURGERY         History reviewed.  No pertinent family history. Medications have been verified. Objective   /62   Pulse 90   Temp (!) 96 °F (35.6 °C)   Resp 19   Ht 5' 5" (1.651 m)   Wt 102 kg (225 lb)   SpO2 94%   BMI 37.44 kg/m²        Physical Exam     Physical Exam  Vitals and nursing note reviewed. Constitutional:       General: He is not in acute distress. Appearance: Normal appearance. He is normal weight. He is not ill-appearing, toxic-appearing or diaphoretic. HENT:      Head: Normocephalic. Right Ear: Tympanic membrane, ear canal and external ear normal. There is no impacted cerumen. Left Ear: Tympanic membrane, ear canal and external ear normal. There is no impacted cerumen. Nose: Congestion and rhinorrhea present. Mouth/Throat:      Mouth: Mucous membranes are moist.      Pharynx: Oropharynx is clear. No oropharyngeal exudate or posterior oropharyngeal erythema. Eyes:      General:         Right eye: No discharge. Left eye: No discharge. Extraocular Movements: Extraocular movements intact. Conjunctiva/sclera: Conjunctivae normal.      Pupils: Pupils are equal, round, and reactive to light. Neck:      Vascular: No carotid bruit. Cardiovascular:      Rate and Rhythm: Normal rate and regular rhythm. Pulses: Normal pulses. Heart sounds: Normal heart sounds. No murmur heard. No friction rub. No gallop. Pulmonary:      Effort: Pulmonary effort is normal. No respiratory distress. Breath sounds: Normal breath sounds. No stridor. No wheezing, rhonchi or rales. Chest:      Chest wall: No tenderness. Musculoskeletal:         General: No swelling, tenderness or signs of injury. Cervical back: Normal range of motion and neck supple. No rigidity or tenderness. Lymphadenopathy:      Cervical: No cervical adenopathy. Skin:     General: Skin is warm and dry. Capillary Refill: Capillary refill takes less than 2 seconds.       Coloration: Skin is not jaundiced or pale.      Findings: No erythema. Neurological:      General: No focal deficit present. Mental Status: He is alert and oriented to person, place, and time.    Psychiatric:         Mood and Affect: Mood normal.         Behavior: Behavior normal.

## 2023-12-08 ENCOUNTER — OFFICE VISIT (OUTPATIENT)
Dept: NEPHROLOGY | Facility: CLINIC | Age: 58
End: 2023-12-08
Payer: COMMERCIAL

## 2023-12-08 VITALS
DIASTOLIC BLOOD PRESSURE: 74 MMHG | HEART RATE: 87 BPM | BODY MASS INDEX: 37.49 KG/M2 | WEIGHT: 225 LBS | SYSTOLIC BLOOD PRESSURE: 116 MMHG | OXYGEN SATURATION: 97 % | HEIGHT: 65 IN

## 2023-12-08 DIAGNOSIS — R80.9 PROTEINURIA, UNSPECIFIED TYPE: ICD-10-CM

## 2023-12-08 DIAGNOSIS — E11.21 DIABETIC NEPHROPATHY ASSOCIATED WITH TYPE 2 DIABETES MELLITUS (HCC): Primary | ICD-10-CM

## 2023-12-08 DIAGNOSIS — N18.2 CKD (CHRONIC KIDNEY DISEASE) STAGE 2, GFR 60-89 ML/MIN: ICD-10-CM

## 2023-12-08 PROCEDURE — 99214 OFFICE O/P EST MOD 30 MIN: CPT | Performed by: INTERNAL MEDICINE

## 2023-12-08 NOTE — ASSESSMENT & PLAN NOTE
Lab Results   Component Value Date    EGFR 90 09/18/2023    EGFR 94 08/30/2023    EGFR 98 01/19/2020    CREATININE 0.93 09/18/2023    CREATININE 0.88 08/30/2023    CREATININE 0.85 01/19/2020     Kidney function stable at this time, continue optimize care and avoid potential for toxins. Please refer below regarding long-term management for probable diabetic nephropathy.

## 2023-12-08 NOTE — PROGRESS NOTES
[facility] [address] [place] [address]    Name: Wilbert Krabbe  YOB: 1965      Assessment/Plan:    CKD (chronic kidney disease) stage 2, GFR 60-89 ml/min  Lab Results   Component Value Date    EGFR 90 09/18/2023    EGFR 94 08/30/2023    EGFR 98 01/19/2020    CREATININE 0.93 09/18/2023    CREATININE 0.88 08/30/2023    CREATININE 0.85 01/19/2020     Kidney function stable at this time, continue optimize care and avoid potential for toxins. Please refer below regarding long-term management for probable diabetic nephropathy. Diabetic nephropathy associated with type 2 diabetes mellitus ([address])  Patient most likely has diabetic nephropathy. Continue with lisinopril, can consider transition to an angiotensin receptor blocker but will defer at this time. With respect to more aggressive management and care, I recommended initiation of an SGLT-2 inhibitor. However given that we are getting toward the end of the calendar year and the patient will be having a brand-new insurance, January, they will contact the insurance company to see which is covered by the plan and will contact us at that time. Check basic metabolic panel about 2 weeks after initiation of SGLT-2 inhibitor. Of note the patient was made aware of common side effects and is aware of the risk and benefits from this medication and is willing to proceed with taking the medication. Proteinuria  Most likely due to diabetic nephropathy, patient serum and urine electrophoresis was negative for monoclonal gammopathy. It is unlikely that this is due to hyperfiltration induced secondary focal segmental glomerulosclerosis given the amount of proteinuria currently noted on urinalysis. Will continue to monitor, and as noted above we will initiate SGLT-2 inhibitor which may also be helpful should be the latter as opposed to the former and presumed diagnosis.     BMI 37.0-37.9, adult  Continue to encourage weight loss and caloric restriction. Patient is making dietary modifications at this time. Problem List Items Addressed This Visit          Endocrine    Diabetic nephropathy associated with type 2 diabetes mellitus ([address]) - Primary     Patient most likely has diabetic nephropathy. Continue with lisinopril, can consider transition to an angiotensin receptor blocker but will defer at this time. With respect to more aggressive management and care, I recommended initiation of an SGLT-2 inhibitor. However given that we are getting toward the end of the calendar year and the patient will be having a brand-new insurance, January, they will contact the insurance company to see which is covered by the plan and will contact us at that time. Check basic metabolic panel about 2 weeks after initiation of SGLT-2 inhibitor. Of note the patient was made aware of common side effects and is aware of the risk and benefits from this medication and is willing to proceed with taking the medication. Genitourinary    CKD (chronic kidney disease) stage 2, GFR 60-89 ml/min     Lab Results   Component Value Date    EGFR 90 09/18/2023    EGFR 94 08/30/2023    EGFR 98 01/19/2020    CREATININE 0.93 09/18/2023    CREATININE 0.88 08/30/2023    CREATININE 0.85 01/19/2020   Kidney function stable at this time, continue optimize care and avoid potential for toxins. Please refer below regarding long-term management for probable diabetic nephropathy. Other    Proteinuria     Most likely due to diabetic nephropathy, patient serum and urine electrophoresis was negative for monoclonal gammopathy. It is unlikely that this is due to hyperfiltration induced secondary focal segmental glomerulosclerosis given the amount of proteinuria currently noted on urinalysis.   Will continue to monitor, and as noted above we will initiate SGLT-2 inhibitor which may also be helpful should be the latter as opposed to the former and presumed diagnosis. BMI 37.0-37.9, adult     Continue to encourage weight loss and caloric restriction. Patient is making dietary modifications at this time. Patient is currently stable from renal standpoint. As noted above I would like to initiate SGLT-2 inhibitor which the patient is agreeable with proceeding with, however we will wait until his new insurance plan starts in January 2024. Patient will contact the insurance with names of SGLT-2 inhibitors to ask which is preferred, he would then contact us and we will send in for this medication. Check labs about 2 weeks after initiation, we will see him back for regular appointment in about 6 months and see how he is doing. Subjective:      Patient ID: Robert Rodriguez is a 62 y.o. male. Patient presents for follow-up appoint. We reviewed the patient's labs in detail, most recent creatinine is 0.93 mg/dL, there were no significant electrolyte abnormalities noted. He is taking all medications as prescribed with no specific side effects at this time. Patient's urine and serum electrophoresis were negative for monoclonal gammopathy. Vitamin D level was noted to be reduced. Hypertension  This is a chronic problem. The current episode started more than 1 year ago. The problem is unchanged. The problem is controlled. Pertinent negatives include no chest pain, orthopnea or peripheral edema. There are no associated agents to hypertension. Risk factors for coronary artery disease include male gender and obesity. Past treatments include lifestyle changes and ACE inhibitors. Compliance problems include diet. There is no history of kidney disease or heart failure. Identifiable causes of hypertension include chronic renal disease.        The following portions of the patient's history were reviewed and updated as appropriate: allergies, current medications, past family history, past medical history, past social history, past surgical history and problem list.    Review of Systems   Cardiovascular:  Negative for chest pain and orthopnea. All other systems reviewed and are negative.         Social History     Socioeconomic History    Marital status: /Civil Union     Spouse name: None    Number of children: None    Years of education: None    Highest education level: None   Occupational History    None   Tobacco Use    Smoking status: Former    Smokeless tobacco: Never   Vaping Use    Vaping Use: Never used   Substance and Sexual Activity    Alcohol use: Never    Drug use: Never    Sexual activity: None   Other Topics Concern    None   Social History Narrative    None     Social Determinants of Health     Financial Resource Strain: Not on file   Food Insecurity: Not on file   Transportation Needs: Not on file   Physical Activity: Not on file   Stress: Not on file   Social Connections: Not on file   Intimate Partner Violence: Not on file   Housing Stability: Not on file     Past Medical History:   Diagnosis Date    Diabetes mellitus ([address])     Hypertension      Past Surgical History:   Procedure Laterality Date    HERNIA REPAIR      NASAL SEPTUM SURGERY         Current Outpatient Medications:     Apple Cider Vinegar 500 MG TABS, Take 450 mg by mouth, Disp: , Rfl:     Cholecalciferol (Vitamin D-3) 125 MCG (5000 UT) TABS, Take by mouth, Disp: , Rfl:     Garlic 0622 MG TBEC, Take 2,000 mg by mouth, Disp: , Rfl:     glipiZIDE (GLUCOTROL XL) 10 mg 24 hr tablet, Take 10 mg by mouth daily, Disp: , Rfl:     lisinopril (ZESTRIL) 20 mg tablet, Take 20 mg by mouth daily, Disp: , Rfl:     Melatonin 10 MG TABS, Take 10 mg by mouth, Disp: , Rfl:     metFORMIN (GLUCOPHAGE) 1000 MG tablet, Take 1,000 mg by mouth 2 (two) times a day with meals, Disp: , Rfl:     Multiple Vitamins-Minerals (multivitamin with minerals) tablet, Take 1 tablet by mouth daily, Disp: , Rfl:     Omega-3 Fatty Acids (Fish Oil) 1200 MG CAPS, Take 1,200 mg by mouth, Disp: , Rfl:     pioglitazone (ACTOS) 30 mg tablet, Take 30 mg by mouth daily, Disp: , Rfl:     simvastatin (ZOCOR) 20 mg tablet, Take 20 mg by mouth daily at bedtime, Disp: , Rfl:     Lab Results   Component Value Date    SODIUM 138 09/18/2023    K 4.1 09/18/2023     09/18/2023    CO2 28 09/18/2023    AGAP 7 09/18/2023    BUN 17 09/18/2023    CREATININE 0.93 09/18/2023    GLUC 156 (H) 01/19/2020    GLUF 107 (H) 09/18/2023    CALCIUM 9.8 09/18/2023    AST 32 09/18/2023    ALT 55 (H) 09/18/2023    ALKPHOS 26 (L) 09/18/2023    TP 6.8 09/19/2023    TBILI 0.50 09/18/2023    EGFR 90 09/18/2023     Lab Results   Component Value Date    WBC 7.43 08/30/2023    HGB 14.7 08/30/2023    HCT 44.5 08/30/2023    MCV 90 08/30/2023     08/30/2023     No results found for: "CHOLESTEROL"  No results found for: "HDL"  No results found for: "LDLCALC"  No results found for: "TRIG"  No results found for: "CHOLHDL"  No results found for: "[id number]", "TSH"  Lab Results   Component Value Date    CALCIUM 9.8 09/18/2023    PHOS 4.5 09/18/2023     Lab Results   Component Value Date    SPEP See Comment 09/19/2023    UPEP See Comment 09/18/2023     No results found for: "Debbe Haw", "[id number]"        Objective:      /74 (BP Location: Left arm, Patient Position: Sitting)   Pulse 87   Ht 5' 5" (1.651 m)   Wt 102 kg (225 lb)   SpO2 97%   BMI 37.44 kg/m²          Physical Exam  Vitals reviewed. Constitutional:       General: He is not in acute distress. Appearance: Normal appearance. HENT:      Head: Normocephalic and atraumatic. Right Ear: External ear normal.      Left Ear: External ear normal.   Eyes:      Conjunctiva/sclera: Conjunctivae normal.   Cardiovascular:      Rate and Rhythm: Normal rate and regular rhythm. Heart sounds: Normal heart sounds. Pulmonary:      Effort: No respiratory distress. Breath sounds: No wheezing. Abdominal:      Palpations: Abdomen is soft. Skin:     General: Skin is warm and dry. Neurological:      General: No focal deficit present. Mental Status: He is alert and oriented to person, place, and time.    Psychiatric:         Mood and Affect: Mood normal.         Behavior: Behavior normal.

## 2023-12-08 NOTE — ASSESSMENT & PLAN NOTE
Continue to encourage weight loss and caloric restriction. Patient is making dietary modifications at this time.

## 2023-12-08 NOTE — ASSESSMENT & PLAN NOTE
Patient most likely has diabetic nephropathy. Continue with lisinopril, can consider transition to an angiotensin receptor blocker but will defer at this time. With respect to more aggressive management and care, I recommended initiation of an SGLT-2 inhibitor. However given that we are getting toward the end of the calendar year and the patient will be having a brand-new insurance, January, they will contact the insurance company to see which is covered by the plan and will contact us at that time. Check basic metabolic panel about 2 weeks after initiation of SGLT-2 inhibitor. Of note the patient was made aware of common side effects and is aware of the risk and benefits from this medication and is willing to proceed with taking the medication.

## 2023-12-08 NOTE — PATIENT INSTRUCTIONS
Please give me a call in the new year so we can send in for either Greg or Soledad Camargo (or Invokana if the first 2 are not covered) to help with you kidneys and the protein in the urine.

## 2023-12-08 NOTE — ASSESSMENT & PLAN NOTE
Most likely due to diabetic nephropathy, patient serum and urine electrophoresis was negative for monoclonal gammopathy. It is unlikely that this is due to hyperfiltration induced secondary focal segmental glomerulosclerosis given the amount of proteinuria currently noted on urinalysis. Will continue to monitor, and as noted above we will initiate SGLT-2 inhibitor which may also be helpful should be the latter as opposed to the former and presumed diagnosis.

## 2023-12-27 ENCOUNTER — TELEPHONE (OUTPATIENT)
Dept: NEPHROLOGY | Facility: CLINIC | Age: 58
End: 2023-12-27

## 2023-12-27 DIAGNOSIS — E11.29 TYPE 2 DIABETES MELLITUS WITH OTHER DIABETIC KIDNEY COMPLICATION (HCC): Primary | ICD-10-CM

## 2023-12-27 NOTE — TELEPHONE ENCOUNTER
Called and spoke with patient spouse Kiara she is requesting RX for empagliflozin (jaridance)10 mg be cancelled until new year due to insurance.

## 2023-12-27 NOTE — TELEPHONE ENCOUNTER
"Michael\"s spouse Kiara called and stated there insurance will cover Jadiance in the generic form 10 or 25 mg. For a 20$ co pay this is the one she would prefer.  "

## 2023-12-28 NOTE — TELEPHONE ENCOUNTER
Spoke with Kiara patient spouse she with reach out after the 1st.of the year. To have script filled.          Okay I discontinue it. Please ask that they call in when ready to have it filled .

## 2024-01-08 ENCOUNTER — TELEPHONE (OUTPATIENT)
Dept: NEPHROLOGY | Facility: CLINIC | Age: 59
End: 2024-01-08

## 2024-01-08 DIAGNOSIS — E11.21 DIABETIC NEPHROPATHY ASSOCIATED WITH TYPE 2 DIABETES MELLITUS (HCC): Primary | ICD-10-CM

## 2024-01-08 NOTE — TELEPHONE ENCOUNTER
Spoke with Kiara, she stated they would like a 30 day supply incase patient can not take. Please sent to [facility] in . [place].

## 2024-01-08 NOTE — TELEPHONE ENCOUNTER
Patient's wife  Kiara called stating patient's insurance is good now to order Jardiance the generic form will only be  $ 20.00 copay. Please advise

## 2024-01-08 NOTE — TELEPHONE ENCOUNTER
Marguerite, please call back to confirm a 30 or 90 day supply and to the [facility] or other pharmacy.  Also, after 2 weeks, please ask him to have labs done, already in the chart from [facility]

## 2024-01-29 ENCOUNTER — APPOINTMENT (OUTPATIENT)
Dept: LAB | Facility: HOSPITAL | Age: 59
End: 2024-01-29
Payer: COMMERCIAL

## 2024-01-29 DIAGNOSIS — E11.29 TYPE 2 DIABETES MELLITUS WITH OTHER DIABETIC KIDNEY COMPLICATION (HCC): ICD-10-CM

## 2024-01-29 LAB
ANION GAP SERPL CALCULATED.3IONS-SCNC: 6 MMOL/L
BUN SERPL-MCNC: 18 MG/DL (ref 5–25)
CALCIUM SERPL-MCNC: 9.5 MG/DL (ref 8.4–10.2)
CHLORIDE SERPL-SCNC: 104 MMOL/L (ref 96–108)
CO2 SERPL-SCNC: 27 MMOL/L (ref 21–32)
CREAT SERPL-MCNC: 0.97 MG/DL (ref 0.6–1.3)
GFR SERPL CREATININE-BSD FRML MDRD: 85 ML/MIN/1.73SQ M
GLUCOSE P FAST SERPL-MCNC: 152 MG/DL (ref 65–99)
POTASSIUM SERPL-SCNC: 4.1 MMOL/L (ref 3.5–5.3)
SODIUM SERPL-SCNC: 137 MMOL/L (ref 135–147)

## 2024-01-29 PROCEDURE — 80048 BASIC METABOLIC PNL TOTAL CA: CPT

## 2024-01-29 PROCEDURE — 36415 COLL VENOUS BLD VENIPUNCTURE: CPT

## 2024-01-30 ENCOUNTER — TELEPHONE (OUTPATIENT)
Dept: NEPHROLOGY | Facility: CLINIC | Age: 59
End: 2024-01-30

## 2024-01-30 DIAGNOSIS — E11.21 DIABETIC NEPHROPATHY ASSOCIATED WITH TYPE 2 DIABETES MELLITUS (HCC): ICD-10-CM

## 2024-01-30 NOTE — TELEPHONE ENCOUNTER
----- Message from DANIEL Amador sent at 1/30/2024  9:22 AM EST -----  I called and spoke with patient's wife Kiara, she is aware of Kidney function is stable with initiation of Jardiance. Is he having any side effects?   Kiara asked if he should have a 3 month supply since he is not having any issues.

## 2024-03-18 ENCOUNTER — TELEPHONE (OUTPATIENT)
Dept: NEPHROLOGY | Facility: CLINIC | Age: 59
End: 2024-03-18

## 2024-03-18 DIAGNOSIS — E55.9 VITAMIN D INSUFFICIENCY: ICD-10-CM

## 2024-03-18 DIAGNOSIS — E11.21 DIABETIC NEPHROPATHY ASSOCIATED WITH TYPE 2 DIABETES MELLITUS (HCC): Primary | ICD-10-CM

## 2024-04-06 LAB
ALBUMIN SERPL BCP-MCNC: 4.7 G/DL (ref 3.5–5)
ALP SERPL-CCNC: 32 U/L (ref 34–104)
ALT SERPL W P-5'-P-CCNC: 41 U/L (ref 7–52)
ANION GAP SERPL CALCULATED.3IONS-SCNC: 7 MMOL/L (ref 4–13)
AST SERPL W P-5'-P-CCNC: 23 U/L (ref 13–39)
BILIRUB SERPL-MCNC: 0.41 MG/DL (ref 0.2–1)
BUN SERPL-MCNC: 19 MG/DL (ref 5–25)
CALCIUM SERPL-MCNC: 9.6 MG/DL (ref 8.4–10.2)
CHLORIDE SERPL-SCNC: 103 MMOL/L (ref 96–108)
CHOLEST SERPL-MCNC: 136 MG/DL
CO2 SERPL-SCNC: 27 MMOL/L (ref 21–32)
CREAT SERPL-MCNC: 0.97 MG/DL (ref 0.6–1.3)
EST. AVERAGE GLUCOSE BLD GHB EST-MCNC: 157 MG/DL
GFR SERPL CREATININE-BSD FRML MDRD: 85 ML/MIN/1.73SQ M
GLUCOSE SERPL-MCNC: 106 MG/DL (ref 65–140)
HBA1C MFR BLD: 7.1 %
HDLC SERPL-MCNC: 48 MG/DL
LDLC SERPL CALC-MCNC: 62 MG/DL (ref 0–100)
POTASSIUM SERPL-SCNC: 4.6 MMOL/L (ref 3.5–5.3)
PROT SERPL-MCNC: 7.2 G/DL (ref 6.4–8.4)
SODIUM SERPL-SCNC: 137 MMOL/L (ref 135–147)
TRIGL SERPL-MCNC: 131 MG/DL

## 2024-04-08 ENCOUNTER — APPOINTMENT (OUTPATIENT)
Dept: LAB | Facility: HOSPITAL | Age: 59
End: 2024-04-08
Payer: COMMERCIAL

## 2024-04-08 DIAGNOSIS — E78.5 DYSLIPIDEMIA, GOAL LDL BELOW 100: ICD-10-CM

## 2024-04-08 DIAGNOSIS — I10 ESSENTIAL HYPERTENSION WITH GOAL BLOOD PRESSURE LESS THAN 140/90: ICD-10-CM

## 2024-04-08 DIAGNOSIS — E11.9 TYPE 2 DIABETES MELLITUS WITH HEMOGLOBIN A1C GOAL OF LESS THAN 7.0% (HCC): ICD-10-CM

## 2024-04-08 PROCEDURE — 83036 HEMOGLOBIN GLYCOSYLATED A1C: CPT

## 2024-04-08 PROCEDURE — 80061 LIPID PANEL: CPT

## 2024-04-08 PROCEDURE — 36415 COLL VENOUS BLD VENIPUNCTURE: CPT

## 2024-04-08 PROCEDURE — 80053 COMPREHEN METABOLIC PANEL: CPT

## 2024-07-03 ENCOUNTER — TELEPHONE (OUTPATIENT)
Dept: NEPHROLOGY | Facility: CLINIC | Age: 59
End: 2024-07-03

## 2024-07-03 NOTE — TELEPHONE ENCOUNTER
Called and spoke to patient's wife.   She will make sure he has lab/urine studies done for upcoming appt.

## 2024-07-12 ENCOUNTER — APPOINTMENT (OUTPATIENT)
Dept: LAB | Facility: HOSPITAL | Age: 59
End: 2024-07-12
Payer: COMMERCIAL

## 2024-07-12 DIAGNOSIS — E11.21 DIABETIC NEPHROPATHY ASSOCIATED WITH TYPE 2 DIABETES MELLITUS (HCC): ICD-10-CM

## 2024-07-12 DIAGNOSIS — E55.9 VITAMIN D INSUFFICIENCY: ICD-10-CM

## 2024-07-12 LAB
25(OH)D3 SERPL-MCNC: 57.7 NG/ML (ref 30–100)
ALBUMIN SERPL BCG-MCNC: 4.5 G/DL (ref 3.5–5)
ALP SERPL-CCNC: 31 U/L (ref 34–104)
ALT SERPL W P-5'-P-CCNC: 43 U/L (ref 7–52)
ANION GAP SERPL CALCULATED.3IONS-SCNC: 7 MMOL/L (ref 4–13)
AST SERPL W P-5'-P-CCNC: 25 U/L (ref 13–39)
BACTERIA UR QL AUTO: ABNORMAL /HPF
BASOPHILS # BLD AUTO: 0.04 THOUSANDS/ÂΜL (ref 0–0.1)
BASOPHILS NFR BLD AUTO: 1 % (ref 0–1)
BILIRUB SERPL-MCNC: 0.43 MG/DL (ref 0.2–1)
BILIRUB UR QL STRIP: NEGATIVE
BUN SERPL-MCNC: 20 MG/DL (ref 5–25)
CALCIUM SERPL-MCNC: 9.8 MG/DL (ref 8.4–10.2)
CHLORIDE SERPL-SCNC: 102 MMOL/L (ref 96–108)
CLARITY UR: CLEAR
CO2 SERPL-SCNC: 27 MMOL/L (ref 21–32)
COLOR UR: YELLOW
CREAT SERPL-MCNC: 0.96 MG/DL (ref 0.6–1.3)
CREAT UR-MCNC: 148.1 MG/DL
EOSINOPHIL # BLD AUTO: 0.21 THOUSAND/ÂΜL (ref 0–0.61)
EOSINOPHIL NFR BLD AUTO: 3 % (ref 0–6)
ERYTHROCYTE [DISTWIDTH] IN BLOOD BY AUTOMATED COUNT: 13.9 % (ref 11.6–15.1)
GFR SERPL CREATININE-BSD FRML MDRD: 86 ML/MIN/1.73SQ M
GLUCOSE P FAST SERPL-MCNC: 82 MG/DL (ref 65–99)
GLUCOSE UR STRIP-MCNC: ABNORMAL MG/DL
HCT VFR BLD AUTO: 48.1 % (ref 36.5–49.3)
HGB BLD-MCNC: 15.1 G/DL (ref 12–17)
HGB UR QL STRIP.AUTO: NEGATIVE
IMM GRANULOCYTES # BLD AUTO: 0.03 THOUSAND/UL (ref 0–0.2)
IMM GRANULOCYTES NFR BLD AUTO: 0 % (ref 0–2)
KETONES UR STRIP-MCNC: NEGATIVE MG/DL
LEUKOCYTE ESTERASE UR QL STRIP: ABNORMAL
LYMPHOCYTES # BLD AUTO: 2.1 THOUSANDS/ÂΜL (ref 0.6–4.47)
LYMPHOCYTES NFR BLD AUTO: 27 % (ref 14–44)
MAGNESIUM SERPL-MCNC: 2 MG/DL (ref 1.9–2.7)
MCH RBC QN AUTO: 28.2 PG (ref 26.8–34.3)
MCHC RBC AUTO-ENTMCNC: 31.4 G/DL (ref 31.4–37.4)
MCV RBC AUTO: 90 FL (ref 82–98)
MICROALBUMIN UR-MCNC: 108.2 MG/L
MICROALBUMIN/CREAT 24H UR: 73 MG/G CREATININE (ref 0–30)
MONOCYTES # BLD AUTO: 0.65 THOUSAND/ÂΜL (ref 0.17–1.22)
MONOCYTES NFR BLD AUTO: 8 % (ref 4–12)
NEUTROPHILS # BLD AUTO: 4.86 THOUSANDS/ÂΜL (ref 1.85–7.62)
NEUTS SEG NFR BLD AUTO: 61 % (ref 43–75)
NITRITE UR QL STRIP: NEGATIVE
NON-SQ EPI CELLS URNS QL MICRO: ABNORMAL /HPF
NRBC BLD AUTO-RTO: 0 /100 WBCS
PH UR STRIP.AUTO: 6 [PH]
PLATELET # BLD AUTO: 235 THOUSANDS/UL (ref 149–390)
PMV BLD AUTO: 9.2 FL (ref 8.9–12.7)
POTASSIUM SERPL-SCNC: 4.4 MMOL/L (ref 3.5–5.3)
PROT SERPL-MCNC: 7.4 G/DL (ref 6.4–8.4)
PROT UR STRIP-MCNC: ABNORMAL MG/DL
RBC # BLD AUTO: 5.36 MILLION/UL (ref 3.88–5.62)
RBC #/AREA URNS AUTO: ABNORMAL /HPF
SODIUM SERPL-SCNC: 136 MMOL/L (ref 135–147)
SP GR UR STRIP.AUTO: >=1.03 (ref 1–1.03)
UROBILINOGEN UR QL STRIP.AUTO: 0.2 E.U./DL
WBC # BLD AUTO: 7.89 THOUSAND/UL (ref 4.31–10.16)
WBC #/AREA URNS AUTO: ABNORMAL /HPF

## 2024-07-12 PROCEDURE — 81001 URINALYSIS AUTO W/SCOPE: CPT

## 2024-07-12 PROCEDURE — 85025 COMPLETE CBC W/AUTO DIFF WBC: CPT

## 2024-07-12 PROCEDURE — 83735 ASSAY OF MAGNESIUM: CPT

## 2024-07-12 PROCEDURE — 82306 VITAMIN D 25 HYDROXY: CPT

## 2024-07-12 PROCEDURE — 82570 ASSAY OF URINE CREATININE: CPT

## 2024-07-12 PROCEDURE — 80053 COMPREHEN METABOLIC PANEL: CPT

## 2024-07-12 PROCEDURE — 36415 COLL VENOUS BLD VENIPUNCTURE: CPT

## 2024-07-12 PROCEDURE — 82043 UR ALBUMIN QUANTITATIVE: CPT

## 2024-07-17 ENCOUNTER — OFFICE VISIT (OUTPATIENT)
Age: 59
End: 2024-07-17
Payer: COMMERCIAL

## 2024-07-17 VITALS
HEART RATE: 92 BPM | OXYGEN SATURATION: 94 % | DIASTOLIC BLOOD PRESSURE: 82 MMHG | SYSTOLIC BLOOD PRESSURE: 120 MMHG | WEIGHT: 226 LBS | HEIGHT: 66 IN | BODY MASS INDEX: 36.32 KG/M2

## 2024-07-17 DIAGNOSIS — N18.2 CKD (CHRONIC KIDNEY DISEASE) STAGE 2, GFR 60-89 ML/MIN: ICD-10-CM

## 2024-07-17 DIAGNOSIS — E11.59 HYPERTENSION ASSOCIATED WITH DIABETES  (HCC): Primary | ICD-10-CM

## 2024-07-17 DIAGNOSIS — E55.9 VITAMIN D INSUFFICIENCY: ICD-10-CM

## 2024-07-17 DIAGNOSIS — E11.21 DIABETIC NEPHROPATHY ASSOCIATED WITH TYPE 2 DIABETES MELLITUS (HCC): ICD-10-CM

## 2024-07-17 DIAGNOSIS — I15.2 HYPERTENSION ASSOCIATED WITH DIABETES  (HCC): Primary | ICD-10-CM

## 2024-07-17 PROCEDURE — 99214 OFFICE O/P EST MOD 30 MIN: CPT | Performed by: INTERNAL MEDICINE

## 2024-07-17 NOTE — PROGRESS NOTES
[facility] [place]    Name: Michael Baeza  YOB: 1965      Assessment/Plan:    Diabetic nephropathy associated with type 2 diabetes mellitus (HCC)  Proteinuria slowly improving, continue with current care including Jardiance.  Will continue to focus on improving diet with low carbohydrates as well as weight loss.    Will initiate Ozempic toward this end.  Patient provided with sample from the office, will send prescription to pharmacy and start the process of prior authorization if indicated.  Will stop pioglitazone once Ozempic has begun.    Lab Results   Component Value Date    HGBA1C 7.1 (H) 04/06/2024       Hypertension associated with diabetes  (HCC)  Blood pressure controlled at this time with current medication, lisinopril 20 mg daily.  Continue to focus on low-sodium diet.    Lab Results   Component Value Date    HGBA1C 7.1 (H) 04/06/2024       CKD (chronic kidney disease) stage 2, GFR 60-89 ml/min  Lab Results   Component Value Date    EGFR 86 07/12/2024    EGFR 85 04/06/2024    EGFR 85 01/29/2024    CREATININE 0.96 07/12/2024    CREATININE 0.97 04/06/2024    CREATININE 0.97 01/29/2024     Thankfully, the patient's kidney function remained stable at this time.  Continue to focus on optimization of medical care and management in general.         Problem List Items Addressed This Visit          Cardiovascular and Mediastinum    Hypertension associated with diabetes  (HCC) - Primary     Blood pressure controlled at this time with current medication, lisinopril 20 mg daily.  Continue to focus on low-sodium diet.    Lab Results   Component Value Date    HGBA1C 7.1 (H) 04/06/2024            Relevant Medications    semaglutide, 0.25 or 0.5 mg/dose, (Ozempic, 0.25 or 0.5 MG/DOSE,) 2 mg/3 mL injection pen       Endocrine    Diabetic nephropathy associated with type 2 diabetes mellitus (HCC)     Proteinuria slowly improving, continue with current  care including Jardiance.  Will continue to focus on improving diet with low carbohydrates as well as weight loss.    Will initiate Ozempic toward this end.  Patient provided with sample from the office, will send prescription to pharmacy and start the process of prior authorization if indicated.  Will stop pioglitazone once Ozempic has begun.    Lab Results   Component Value Date    HGBA1C 7.1 (H) 04/06/2024            Relevant Medications    semaglutide, 0.25 or 0.5 mg/dose, (Ozempic, 0.25 or 0.5 MG/DOSE,) 2 mg/3 mL injection pen    Other Relevant Orders    Albumin / creatinine urine ratio    Urinalysis with microscopic    CBC and differential    Comprehensive metabolic panel    Magnesium    Hemoglobin A1C       Genitourinary    CKD (chronic kidney disease) stage 2, GFR 60-89 ml/min     Lab Results   Component Value Date    EGFR 86 07/12/2024    EGFR 85 04/06/2024    EGFR 85 01/29/2024    CREATININE 0.96 07/12/2024    CREATININE 0.97 04/06/2024    CREATININE 0.97 01/29/2024     Thankfully, the patient's kidney function remained stable at this time.  Continue to focus on optimization of medical care and management in general.            Other    Vitamin D insufficiency       Patient stable from renal standpoint.  Ozempic initiated, pioglitazone recommended to be discontinued once Ozempic has been started.  Check regular labs prior to his next visit with us in about 6 months.  No other significant medication changes recommended.  However, the patient to keep track of his blood sugars and depending on how he responds to the Ozempic we may be able to peel away other medications with the first medication to be glipizide, or reduction in dose depending what is indicated clinically.    Subjective:      Patient ID: Michael Baeza is a 59 y.o. male.    Patient presents for follow up.    We reviewed labs in detail, most recent creatinine noted to be 0.96 mg/dL, estimate GFR is 86 mL/min    There were no significant  electrolyte abnormalities noted.  Patient is taking all medications as prescribed with no specific side effects and denies use of nonsteroid anti-inflammatory medications.              The following portions of the patient's history were reviewed and updated as appropriate: allergies, current medications, past family history, past medical history, past social history, past surgical history and problem list.    Review of Systems   All other systems reviewed and are negative.        Social History     Socioeconomic History    Marital status: /Civil Union     Spouse name: None    Number of children: None    Years of education: None    Highest education level: None   Occupational History    None   Tobacco Use    Smoking status: Former    Smokeless tobacco: Never   Vaping Use    Vaping status: Never Used   Substance and Sexual Activity    Alcohol use: Never    Drug use: Never    Sexual activity: None   Other Topics Concern    None   Social History Narrative    None     Social Determinants of Health     Financial Resource Strain: Not on file   Food Insecurity: Not on file   Transportation Needs: Not on file   Physical Activity: Not on file   Stress: Not on file   Social Connections: Not on file   Intimate Partner Violence: Not on file   Housing Stability: Not on file     Past Medical History:   Diagnosis Date    Diabetes mellitus (HCC)     Hypertension      Past Surgical History:   Procedure Laterality Date    HERNIA REPAIR      NASAL SEPTUM SURGERY         Current Outpatient Medications:     Apple Cider Vinegar 500 MG TABS, Take 450 mg by mouth, Disp: , Rfl:     Cholecalciferol (Vitamin D-3) 125 MCG (5000 UT) TABS, Take by mouth, Disp: , Rfl:     Empagliflozin (Jardiance) 10 MG TABS tablet, Take 1 tablet (10 mg total) by mouth every morning, Disp: 90 tablet, Rfl: 3    Garlic 2000 MG TBEC, Take 2,000 mg by mouth, Disp: , Rfl:     glipiZIDE (GLUCOTROL XL) 10 mg 24 hr tablet, Take 10 mg by mouth daily, Disp: , Rfl:  "    lisinopril (ZESTRIL) 20 mg tablet, Take 20 mg by mouth daily, Disp: , Rfl:     Melatonin 10 MG TABS, Take 10 mg by mouth, Disp: , Rfl:     metFORMIN (GLUCOPHAGE) 1000 MG tablet, Take 1,000 mg by mouth 2 (two) times a day with meals, Disp: , Rfl:     Multiple Vitamins-Minerals (multivitamin with minerals) tablet, Take 1 tablet by mouth daily, Disp: , Rfl:     Omega-3 Fatty Acids (Fish Oil) 1200 MG CAPS, Take 1,200 mg by mouth, Disp: , Rfl:     pioglitazone (ACTOS) 30 mg tablet, Take 30 mg by mouth daily, Disp: , Rfl:     semaglutide, 0.25 or 0.5 mg/dose, (Ozempic, 0.25 or 0.5 MG/DOSE,) 2 mg/3 mL injection pen, Inject 0.75 mL (0.5 mg total) under the skin every 7 days, Disp: 3 mL, Rfl: 3    simvastatin (ZOCOR) 20 mg tablet, Take 20 mg by mouth daily at bedtime, Disp: , Rfl:     Lab Results   Component Value Date    SODIUM 136 07/12/2024    K 4.4 07/12/2024     07/12/2024    CO2 27 07/12/2024    AGAP 7 07/12/2024    BUN 20 07/12/2024    CREATININE 0.96 07/12/2024    GLUC 106 04/06/2024    GLUF 82 07/12/2024    CALCIUM 9.8 07/12/2024    AST 25 07/12/2024    ALT 43 07/12/2024    ALKPHOS 31 (L) 07/12/2024    TP 7.4 07/12/2024    TBILI 0.43 07/12/2024    EGFR 86 07/12/2024     Lab Results   Component Value Date    WBC 7.89 07/12/2024    HGB 15.1 07/12/2024    HCT 48.1 07/12/2024    MCV 90 07/12/2024     07/12/2024     Lab Results   Component Value Date    CHOLESTEROL 136 04/06/2024     Lab Results   Component Value Date    HDL 48 04/06/2024     Lab Results   Component Value Date    LDLCALC 62 04/06/2024     Lab Results   Component Value Date    TRIG 131 04/06/2024     No results found for: \"CHOLHDL\"  No results found for: \"[id number]\", \"TSH\"  Lab Results   Component Value Date    CALCIUM 9.8 07/12/2024    PHOS 4.5 09/18/2023     Lab Results   Component Value Date    SPEP See Comment 09/19/2023    UPEP See Comment 09/18/2023     No results found for: \"MICROALBUR\", \"[id number]\"        Objective:      BP " "120/82 (BP Location: Left arm, Patient Position: Sitting)   Pulse 92   Ht 5' 6\" (1.676 m)   Wt 103 kg (226 lb)   SpO2 94%   BMI 36.48 kg/m²          Physical Exam  Vitals reviewed.   Constitutional:       General: He is not in acute distress.     Appearance: Normal appearance.   HENT:      Head: Normocephalic and atraumatic.      Right Ear: External ear normal.      Left Ear: External ear normal.   Eyes:      Conjunctiva/sclera: Conjunctivae normal.   Cardiovascular:      Rate and Rhythm: Normal rate and regular rhythm.      Heart sounds: Normal heart sounds.   Pulmonary:      Effort: No respiratory distress.      Breath sounds: No wheezing.   Abdominal:      Palpations: Abdomen is soft.   Skin:     General: Skin is warm and dry.   Neurological:      General: No focal deficit present.      Mental Status: He is alert and oriented to person, place, and time.   Psychiatric:         Mood and Affect: Mood normal.         Behavior: Behavior normal.         "

## 2024-07-17 NOTE — PATIENT INSTRUCTIONS
Please only take 0.25 mg once weekly for about 2 weeks, if you feel like it is working, then stay at that dose.  If there is no change in your appetite, then increase to the 0.5 mg dose and we will go from there.    If you can start the Ozempic, then please stop the pioglitazone.  We may be able to stop the glipizide as well.  Please keep track of your blood sugars and if they are going low then I would stop the glipizide ASAP.

## 2024-07-31 NOTE — ASSESSMENT & PLAN NOTE
Proteinuria slowly improving, continue with current care including Jardiance.  Will continue to focus on improving diet with low carbohydrates as well as weight loss.    Will initiate Ozempic toward this end.  Patient provided with sample from the office, will send prescription to pharmacy and start the process of prior authorization if indicated.  Will stop pioglitazone once Ozempic has begun.    Lab Results   Component Value Date    HGBA1C 7.1 (H) 04/06/2024

## 2024-07-31 NOTE — ASSESSMENT & PLAN NOTE
Lab Results   Component Value Date    EGFR 86 07/12/2024    EGFR 85 04/06/2024    EGFR 85 01/29/2024    CREATININE 0.96 07/12/2024    CREATININE 0.97 04/06/2024    CREATININE 0.97 01/29/2024     Thankfully, the patient's kidney function remained stable at this time.  Continue to focus on optimization of medical care and management in general.

## 2024-07-31 NOTE — ASSESSMENT & PLAN NOTE
Blood pressure controlled at this time with current medication, lisinopril 20 mg daily.  Continue to focus on low-sodium diet.    Lab Results   Component Value Date    HGBA1C 7.1 (H) 04/06/2024

## 2024-11-08 ENCOUNTER — APPOINTMENT (OUTPATIENT)
Dept: LAB | Facility: HOSPITAL | Age: 59
End: 2024-11-08
Payer: COMMERCIAL

## 2024-11-08 DIAGNOSIS — K76.0 NAFLD (NONALCOHOLIC FATTY LIVER DISEASE): ICD-10-CM

## 2024-11-08 DIAGNOSIS — R80.9 DIABETES MELLITUS WITH PROTEINURIA  (HCC): ICD-10-CM

## 2024-11-08 DIAGNOSIS — I10 ESSENTIAL HYPERTENSION WITH GOAL BLOOD PRESSURE LESS THAN 140/90: ICD-10-CM

## 2024-11-08 DIAGNOSIS — E11.21 DIABETIC NEPHROPATHY ASSOCIATED WITH TYPE 2 DIABETES MELLITUS (HCC): ICD-10-CM

## 2024-11-08 DIAGNOSIS — E11.9 TYPE 2 DIABETES MELLITUS WITH HEMOGLOBIN A1C GOAL OF LESS THAN 7.0% (HCC): ICD-10-CM

## 2024-11-08 DIAGNOSIS — E11.29 DIABETES MELLITUS WITH PROTEINURIA  (HCC): ICD-10-CM

## 2024-11-08 DIAGNOSIS — E08.40 DIABETES MELLITUS DUE TO UNDERLYING CONDITION WITH DIABETIC NEUROPATHY, WITHOUT LONG-TERM CURRENT USE OF INSULIN (HCC): ICD-10-CM

## 2024-11-08 DIAGNOSIS — E78.5 DYSLIPIDEMIA, GOAL LDL BELOW 100: ICD-10-CM

## 2024-11-08 LAB
25(OH)D3 SERPL-MCNC: 46.3 NG/ML (ref 30–100)
ALBUMIN SERPL BCG-MCNC: 4.8 G/DL (ref 3.5–5)
ALP SERPL-CCNC: 32 U/L (ref 34–104)
ALT SERPL W P-5'-P-CCNC: 68 U/L (ref 7–52)
ANION GAP SERPL CALCULATED.3IONS-SCNC: 8 MMOL/L (ref 4–13)
AST SERPL W P-5'-P-CCNC: 35 U/L (ref 13–39)
BACTERIA UR QL AUTO: ABNORMAL /HPF
BASOPHILS # BLD AUTO: 0.06 THOUSANDS/ÂΜL (ref 0–0.1)
BASOPHILS NFR BLD AUTO: 1 % (ref 0–1)
BILIRUB SERPL-MCNC: 0.61 MG/DL (ref 0.2–1)
BILIRUB UR QL STRIP: NEGATIVE
BUN SERPL-MCNC: 18 MG/DL (ref 5–25)
CALCIUM SERPL-MCNC: 10 MG/DL (ref 8.4–10.2)
CHLORIDE SERPL-SCNC: 104 MMOL/L (ref 96–108)
CHOLEST SERPL-MCNC: 129 MG/DL
CLARITY UR: CLEAR
CO2 SERPL-SCNC: 23 MMOL/L (ref 21–32)
COLOR UR: YELLOW
CREAT SERPL-MCNC: 0.95 MG/DL (ref 0.6–1.3)
CREAT UR-MCNC: 112.2 MG/DL
EOSINOPHIL # BLD AUTO: 0.19 THOUSAND/ÂΜL (ref 0–0.61)
EOSINOPHIL NFR BLD AUTO: 2 % (ref 0–6)
ERYTHROCYTE [DISTWIDTH] IN BLOOD BY AUTOMATED COUNT: 13.9 % (ref 11.6–15.1)
EST. AVERAGE GLUCOSE BLD GHB EST-MCNC: 151 MG/DL
GFR SERPL CREATININE-BSD FRML MDRD: 87 ML/MIN/1.73SQ M
GLUCOSE P FAST SERPL-MCNC: 119 MG/DL (ref 65–99)
GLUCOSE UR STRIP-MCNC: ABNORMAL MG/DL
HBA1C MFR BLD: 6.9 %
HCT VFR BLD AUTO: 47.9 % (ref 36.5–49.3)
HDLC SERPL-MCNC: 41 MG/DL
HGB BLD-MCNC: 15.4 G/DL (ref 12–17)
HGB UR QL STRIP.AUTO: NEGATIVE
IMM GRANULOCYTES # BLD AUTO: 0.05 THOUSAND/UL (ref 0–0.2)
IMM GRANULOCYTES NFR BLD AUTO: 1 % (ref 0–2)
KETONES UR STRIP-MCNC: NEGATIVE MG/DL
LDLC SERPL CALC-MCNC: 65 MG/DL (ref 0–100)
LEUKOCYTE ESTERASE UR QL STRIP: ABNORMAL
LYMPHOCYTES # BLD AUTO: 2.01 THOUSANDS/ÂΜL (ref 0.6–4.47)
LYMPHOCYTES NFR BLD AUTO: 21 % (ref 14–44)
MAGNESIUM SERPL-MCNC: 2 MG/DL (ref 1.9–2.7)
MCH RBC QN AUTO: 28.2 PG (ref 26.8–34.3)
MCHC RBC AUTO-ENTMCNC: 32.2 G/DL (ref 31.4–37.4)
MCV RBC AUTO: 88 FL (ref 82–98)
MICROALBUMIN UR-MCNC: 53.8 MG/L
MICROALBUMIN/CREAT 24H UR: 48 MG/G CREATININE (ref 0–30)
MONOCYTES # BLD AUTO: 0.65 THOUSAND/ÂΜL (ref 0.17–1.22)
MONOCYTES NFR BLD AUTO: 7 % (ref 4–12)
NEUTROPHILS # BLD AUTO: 6.61 THOUSANDS/ÂΜL (ref 1.85–7.62)
NEUTS SEG NFR BLD AUTO: 68 % (ref 43–75)
NITRITE UR QL STRIP: NEGATIVE
NON-SQ EPI CELLS URNS QL MICRO: ABNORMAL /HPF
NRBC BLD AUTO-RTO: 0 /100 WBCS
PH UR STRIP.AUTO: 6 [PH]
PLATELET # BLD AUTO: 256 THOUSANDS/UL (ref 149–390)
PMV BLD AUTO: 9.2 FL (ref 8.9–12.7)
POTASSIUM SERPL-SCNC: 4.7 MMOL/L (ref 3.5–5.3)
PROT SERPL-MCNC: 7.5 G/DL (ref 6.4–8.4)
PROT UR STRIP-MCNC: NEGATIVE MG/DL
RBC # BLD AUTO: 5.46 MILLION/UL (ref 3.88–5.62)
RBC #/AREA URNS AUTO: ABNORMAL /HPF
SODIUM SERPL-SCNC: 135 MMOL/L (ref 135–147)
SP GR UR STRIP.AUTO: 1.02 (ref 1–1.03)
TRIGL SERPL-MCNC: 117 MG/DL
UROBILINOGEN UR QL STRIP.AUTO: 0.2 E.U./DL
VIT B12 SERPL-MCNC: 605 PG/ML (ref 180–914)
WBC # BLD AUTO: 9.57 THOUSAND/UL (ref 4.31–10.16)
WBC #/AREA URNS AUTO: ABNORMAL /HPF

## 2024-11-08 PROCEDURE — 80061 LIPID PANEL: CPT

## 2024-11-08 PROCEDURE — 81001 URINALYSIS AUTO W/SCOPE: CPT

## 2024-11-08 PROCEDURE — 85025 COMPLETE CBC W/AUTO DIFF WBC: CPT

## 2024-11-08 PROCEDURE — 82570 ASSAY OF URINE CREATININE: CPT

## 2024-11-08 PROCEDURE — 82607 VITAMIN B-12: CPT

## 2024-11-08 PROCEDURE — 82043 UR ALBUMIN QUANTITATIVE: CPT

## 2024-11-08 PROCEDURE — 36415 COLL VENOUS BLD VENIPUNCTURE: CPT

## 2024-11-08 PROCEDURE — 83036 HEMOGLOBIN GLYCOSYLATED A1C: CPT

## 2024-11-08 PROCEDURE — 80053 COMPREHEN METABOLIC PANEL: CPT

## 2024-11-08 PROCEDURE — 83735 ASSAY OF MAGNESIUM: CPT

## 2024-11-08 PROCEDURE — 82306 VITAMIN D 25 HYDROXY: CPT

## 2024-11-12 ENCOUNTER — TELEPHONE (OUTPATIENT)
Age: 59
End: 2024-11-12

## 2024-11-12 NOTE — TELEPHONE ENCOUNTER
I called and spoke with Kiara regarding the following:    ----- Message from Raulito Christy DO sent at 11/11/2024  6:58 PM EST -----  Labs stable to slightly improved, we will review in detail at next visit      She is aware of results. She had no questions or concerns.

## 2024-11-27 ENCOUNTER — OFFICE VISIT (OUTPATIENT)
Age: 59
End: 2024-11-27
Payer: COMMERCIAL

## 2024-11-27 VITALS
WEIGHT: 223.6 LBS | HEART RATE: 91 BPM | BODY MASS INDEX: 37.25 KG/M2 | TEMPERATURE: 98 F | OXYGEN SATURATION: 96 % | SYSTOLIC BLOOD PRESSURE: 128 MMHG | DIASTOLIC BLOOD PRESSURE: 84 MMHG | HEIGHT: 65 IN

## 2024-11-27 DIAGNOSIS — N18.2 CKD (CHRONIC KIDNEY DISEASE) STAGE 2, GFR 60-89 ML/MIN: ICD-10-CM

## 2024-11-27 DIAGNOSIS — E11.21 DIABETIC NEPHROPATHY ASSOCIATED WITH TYPE 2 DIABETES MELLITUS (HCC): Primary | ICD-10-CM

## 2024-11-27 DIAGNOSIS — E55.9 VITAMIN D INSUFFICIENCY: ICD-10-CM

## 2024-11-27 PROBLEM — E87.3 ALKALOSIS: Status: RESOLVED | Noted: 2023-09-01 | Resolved: 2024-11-27

## 2024-11-27 PROCEDURE — 99214 OFFICE O/P EST MOD 30 MIN: CPT | Performed by: INTERNAL MEDICINE

## 2024-11-27 NOTE — PROGRESS NOTES
[facility]  Raulito Christy DO    Name: Michael Baeza  YOB: 1965      Assessment/Plan:    CKD (chronic kidney disease) stage 2, GFR 60-89 ml/min  Lab Results   Component Value Date    EGFR 87 11/08/2024    EGFR 86 07/12/2024    EGFR 85 04/06/2024    CREATININE 0.95 11/08/2024    CREATININE 0.96 07/12/2024    CREATININE 0.97 04/06/2024     Kidney function stable, continue to optimize care and avoid nephrotoxins.    Diabetic nephropathy associated with type 2 diabetes mellitus (HCC)  Proteinuria continues to improve, continue with Jardiance and lisinopril.  Blood sugars continue to also stabilize and improve.  Pioglitazone successfully discontinued patient now on 0.5 mg of weekly Ozempic, in addition to the metformin, glipizide, and Jardiance.    Lab Results   Component Value Date    HGBA1C 6.9 (H) 11/08/2024       Vitamin D insufficiency  Continue vitamin D 5000 units daily.  Follow-up vitamin D level prior to next visit.         Problem List Items Addressed This Visit          Endocrine    Diabetic nephropathy associated with type 2 diabetes mellitus (HCC) - Primary    Proteinuria continues to improve, continue with Jardiance and lisinopril.  Blood sugars continue to also stabilize and improve.  Pioglitazone successfully discontinued patient now on 0.5 mg of weekly Ozempic, in addition to the metformin, glipizide, and Jardiance.    Lab Results   Component Value Date    HGBA1C 6.9 (H) 11/08/2024            Relevant Orders    Comprehensive metabolic panel    CBC and differential    Magnesium    Phosphorus    Albumin / creatinine urine ratio    Urinalysis with microscopic       Genitourinary    CKD (chronic kidney disease) stage 2, GFR 60-89 ml/min    Lab Results   Component Value Date    EGFR 87 11/08/2024    EGFR 86 07/12/2024    EGFR 85 04/06/2024    CREATININE 0.95 11/08/2024    CREATININE 0.96 07/12/2024    CREATININE 0.97 04/06/2024     Kidney function  stable, continue to optimize care and avoid nephrotoxins.         Relevant Orders    PTH, intact       Other    Vitamin D insufficiency    Continue vitamin D 5000 units daily.  Follow-up vitamin D level prior to next visit.         Relevant Orders    Vitamin D 25 hydroxy       Patient stay with the renal standpoint.  No medication changes made however, in the interim since we last saw the patient he is on the 0.5 mg dose of Ozempic and tolerating well.  Although he has a little constipation, this appears to be controlled with use of MiraLAX.    We will see him back for regular appointment in the summer 2025, sooner if clinically indicated.  He will also give us a call back in about 3 months to see how he is doing with his weight loss.    Subjective:      Patient ID: Michael Baeza is a 59 y.o. male.    Patient presents for follow up.    We reviewed labs in detail, most recent creatinine noted to be 0.95 mg/dL, estimate GFR 87 mL/min.    There were no significant electrolyte abnormalities noted.  Patient is taking all medications as prescribed with no specific side effects and denies use of nonsteroid anti-inflammatory medications.              The following portions of the patient's history were reviewed and updated as appropriate: allergies, current medications, past family history, past medical history, past social history, past surgical history and problem list.    Review of Systems   All other systems reviewed and are negative.        Social History     Socioeconomic History    Marital status: /Civil Union     Spouse name: None    Number of children: None    Years of education: None    Highest education level: None   Occupational History    None   Tobacco Use    Smoking status: Former    Smokeless tobacco: Never   Vaping Use    Vaping status: Never Used   Substance and Sexual Activity    Alcohol use: Never    Drug use: Never    Sexual activity: None   Other Topics Concern    None   Social History  Narrative    None     Social Drivers of Health     Financial Resource Strain: Not on file   Food Insecurity: Not on file   Transportation Needs: Not on file   Physical Activity: Not on file   Stress: Not on file   Social Connections: Not on file   Intimate Partner Violence: Not on file   Housing Stability: Not on file     Past Medical History:   Diagnosis Date    Alkalosis 09/01/2023    Diabetes mellitus (HCC)     Hypertension      Past Surgical History:   Procedure Laterality Date    HERNIA REPAIR      NASAL SEPTUM SURGERY         Current Outpatient Medications:     Apple Cider Vinegar 500 MG TABS, Take 450 mg by mouth, Disp: , Rfl:     Cholecalciferol (Vitamin D-3) 125 MCG (5000 UT) TABS, Take by mouth, Disp: , Rfl:     Empagliflozin (Jardiance) 10 MG TABS tablet, Take 1 tablet (10 mg total) by mouth every morning, Disp: 90 tablet, Rfl: 3    Garlic 2000 MG TBEC, Take 2,000 mg by mouth, Disp: , Rfl:     glipiZIDE (GLUCOTROL XL) 10 mg 24 hr tablet, Take 10 mg by mouth daily, Disp: , Rfl:     lisinopril (ZESTRIL) 20 mg tablet, Take 20 mg by mouth daily, Disp: , Rfl:     Melatonin 10 MG TABS, Take 10 mg by mouth, Disp: , Rfl:     metFORMIN (GLUCOPHAGE) 1000 MG tablet, Take 1,000 mg by mouth 2 (two) times a day with meals, Disp: , Rfl:     Multiple Vitamins-Minerals (multivitamin with minerals) tablet, Take 1 tablet by mouth daily, Disp: , Rfl:     Omega-3 Fatty Acids (Fish Oil) 1200 MG CAPS, Take 1,200 mg by mouth, Disp: , Rfl:     semaglutide, 0.25 or 0.5 mg/dose, (Ozempic, 0.25 or 0.5 MG/DOSE,) 2 mg/3 mL injection pen, Inject 0.75 mL (0.5 mg total) under the skin every 7 days, Disp: 3 mL, Rfl: 3    simvastatin (ZOCOR) 20 mg tablet, Take 20 mg by mouth daily at bedtime, Disp: , Rfl:     Lab Results   Component Value Date    SODIUM 135 11/08/2024    K 4.7 11/08/2024     11/08/2024    CO2 23 11/08/2024    AGAP 8 11/08/2024    BUN 18 11/08/2024    CREATININE 0.95 11/08/2024    GLUC 106 04/06/2024    GLUF 119 (H)  "11/08/2024    CALCIUM 10.0 11/08/2024    AST 35 11/08/2024    ALT 68 (H) 11/08/2024    ALKPHOS 32 (L) 11/08/2024    TP 7.5 11/08/2024    TBILI 0.61 11/08/2024    EGFR 87 11/08/2024     Lab Results   Component Value Date    WBC 9.57 11/08/2024    HGB 15.4 11/08/2024    HCT 47.9 11/08/2024    MCV 88 11/08/2024     11/08/2024     Lab Results   Component Value Date    CHOLESTEROL 129 11/08/2024    CHOLESTEROL 136 04/06/2024     Lab Results   Component Value Date    HDL 41 11/08/2024    HDL 48 04/06/2024     Lab Results   Component Value Date    LDLCALC 65 11/08/2024    LDLCALC 62 04/06/2024     Lab Results   Component Value Date    TRIG 117 11/08/2024    TRIG 131 04/06/2024     No results found for: \"CHOLHDL\"  No results found for: \"RYD6ABPNHKZX\", \"TSH\"  Lab Results   Component Value Date    CALCIUM 10.0 11/08/2024    PHOS 4.5 09/18/2023     Lab Results   Component Value Date    SPEP See Comment 09/19/2023    UPEP See Comment 09/18/2023     No results found for: \"MICROALBUR\", \"TFTE42LME\"        Objective:      /84 (BP Location: Left arm, Patient Position: Sitting, Cuff Size: Standard)   Pulse 91   Temp 98 °F (36.7 °C) (Temporal)   Ht 5' 5\" (1.651 m)   Wt 101 kg (223 lb 9.6 oz)   SpO2 96%   BMI 37.21 kg/m²          Physical Exam  Vitals reviewed.   Constitutional:       General: He is not in acute distress.     Appearance: Normal appearance.   HENT:      Head: Normocephalic and atraumatic.      Right Ear: External ear normal.      Left Ear: External ear normal.   Eyes:      Conjunctiva/sclera: Conjunctivae normal.   Cardiovascular:      Rate and Rhythm: Normal rate and regular rhythm.      Heart sounds: Normal heart sounds.   Pulmonary:      Effort: No respiratory distress.      Breath sounds: No wheezing.   Abdominal:      Palpations: Abdomen is soft.   Skin:     General: Skin is warm and dry.   Neurological:      General: No focal deficit present.      Mental Status: He is alert and oriented to " person, place, and time.   Psychiatric:         Mood and Affect: Mood normal.         Behavior: Behavior normal.

## 2024-11-27 NOTE — ASSESSMENT & PLAN NOTE
Lab Results   Component Value Date    EGFR 87 11/08/2024    EGFR 86 07/12/2024    EGFR 85 04/06/2024    CREATININE 0.95 11/08/2024    CREATININE 0.96 07/12/2024    CREATININE 0.97 04/06/2024     Kidney function stable, continue to optimize care and avoid nephrotoxins.

## 2024-11-27 NOTE — ASSESSMENT & PLAN NOTE
Proteinuria continues to improve, continue with Jardiance and lisinopril.  Blood sugars continue to also stabilize and improve.  Pioglitazone successfully discontinued patient now on 0.5 mg of weekly Ozempic, in addition to the metformin, glipizide, and Jardiance.    Lab Results   Component Value Date    HGBA1C 6.9 (H) 11/08/2024

## 2024-12-19 DIAGNOSIS — E11.21 DIABETIC NEPHROPATHY ASSOCIATED WITH TYPE 2 DIABETES MELLITUS (HCC): ICD-10-CM

## 2024-12-20 RX ORDER — SEMAGLUTIDE 0.68 MG/ML
INJECTION, SOLUTION SUBCUTANEOUS
Qty: 3 ML | Refills: 0 | Status: SHIPPED | OUTPATIENT
Start: 2024-12-20

## 2025-01-15 DIAGNOSIS — E11.21 DIABETIC NEPHROPATHY ASSOCIATED WITH TYPE 2 DIABETES MELLITUS (HCC): ICD-10-CM

## 2025-01-15 RX ORDER — SEMAGLUTIDE 0.68 MG/ML
INJECTION, SOLUTION SUBCUTANEOUS
Qty: 3 ML | Refills: 1 | Status: SHIPPED | OUTPATIENT
Start: 2025-01-15

## 2025-03-10 DIAGNOSIS — E11.21 DIABETIC NEPHROPATHY ASSOCIATED WITH TYPE 2 DIABETES MELLITUS (HCC): ICD-10-CM

## 2025-03-11 RX ORDER — SEMAGLUTIDE 0.68 MG/ML
INJECTION, SOLUTION SUBCUTANEOUS
Qty: 3 ML | Refills: 5 | Status: SHIPPED | OUTPATIENT
Start: 2025-03-11

## 2025-05-16 ENCOUNTER — APPOINTMENT (OUTPATIENT)
Dept: LAB | Facility: HOSPITAL | Age: 60
End: 2025-05-16
Payer: COMMERCIAL

## 2025-05-16 DIAGNOSIS — E11.9 TYPE 2 DIABETES MELLITUS WITH HEMOGLOBIN A1C GOAL OF LESS THAN 7.0% (HCC): ICD-10-CM

## 2025-05-16 LAB
ALBUMIN SERPL BCG-MCNC: 4.6 G/DL (ref 3.5–5)
ALP SERPL-CCNC: 34 U/L (ref 34–104)
ALT SERPL W P-5'-P-CCNC: 44 U/L (ref 7–52)
ANION GAP SERPL CALCULATED.3IONS-SCNC: 8 MMOL/L (ref 4–13)
AST SERPL W P-5'-P-CCNC: 25 U/L (ref 13–39)
BILIRUB SERPL-MCNC: 0.58 MG/DL (ref 0.2–1)
BUN SERPL-MCNC: 17 MG/DL (ref 5–25)
CALCIUM SERPL-MCNC: 9.6 MG/DL (ref 8.4–10.2)
CHLORIDE SERPL-SCNC: 103 MMOL/L (ref 96–108)
CHOLEST SERPL-MCNC: 130 MG/DL (ref ?–200)
CO2 SERPL-SCNC: 25 MMOL/L (ref 21–32)
CREAT SERPL-MCNC: 0.94 MG/DL (ref 0.6–1.3)
EST. AVERAGE GLUCOSE BLD GHB EST-MCNC: 148 MG/DL
GFR SERPL CREATININE-BSD FRML MDRD: 87 ML/MIN/1.73SQ M
GLUCOSE P FAST SERPL-MCNC: 94 MG/DL (ref 65–99)
HBA1C MFR BLD: 6.8 %
HDLC SERPL-MCNC: 40 MG/DL
LDLC SERPL CALC-MCNC: 69 MG/DL (ref 0–100)
POTASSIUM SERPL-SCNC: 4.5 MMOL/L (ref 3.5–5.3)
PROT SERPL-MCNC: 7 G/DL (ref 6.4–8.4)
SODIUM SERPL-SCNC: 136 MMOL/L (ref 135–147)
TRIGL SERPL-MCNC: 107 MG/DL (ref ?–150)

## 2025-05-16 PROCEDURE — 83036 HEMOGLOBIN GLYCOSYLATED A1C: CPT

## 2025-05-16 PROCEDURE — 80053 COMPREHEN METABOLIC PANEL: CPT

## 2025-05-16 PROCEDURE — 80061 LIPID PANEL: CPT

## 2025-05-16 PROCEDURE — 36415 COLL VENOUS BLD VENIPUNCTURE: CPT

## 2025-06-09 ENCOUNTER — TELEPHONE (OUTPATIENT)
Age: 60
End: 2025-06-09

## 2025-06-09 NOTE — TELEPHONE ENCOUNTER
"Phone call from patient's wife to reschedule appointment from July due to work scheduled. Physician's last note states \"patient should call back in 3 months to report on weight\". However, patient's wife not aware. Please call patient's wife if weight is still needed otherwise \"she will discuss at appointment on 6/19/25\".     Patient's wife made aware labs are needed prior to upcoming appointment (agreeable).   "

## 2025-06-13 ENCOUNTER — APPOINTMENT (OUTPATIENT)
Dept: LAB | Facility: HOSPITAL | Age: 60
End: 2025-06-13
Payer: COMMERCIAL

## 2025-06-13 DIAGNOSIS — E11.21 DIABETIC NEPHROPATHY ASSOCIATED WITH TYPE 2 DIABETES MELLITUS (HCC): ICD-10-CM

## 2025-06-13 DIAGNOSIS — N18.2 CKD (CHRONIC KIDNEY DISEASE) STAGE 2, GFR 60-89 ML/MIN: ICD-10-CM

## 2025-06-13 DIAGNOSIS — E55.9 VITAMIN D INSUFFICIENCY: ICD-10-CM

## 2025-06-13 LAB
25(OH)D3 SERPL-MCNC: 66.7 NG/ML (ref 30–100)
ALBUMIN SERPL BCG-MCNC: 4.7 G/DL (ref 3.5–5)
ALP SERPL-CCNC: 33 U/L (ref 34–104)
ALT SERPL W P-5'-P-CCNC: 41 U/L (ref 7–52)
ANION GAP SERPL CALCULATED.3IONS-SCNC: 8 MMOL/L (ref 4–13)
AST SERPL W P-5'-P-CCNC: 24 U/L (ref 13–39)
BACTERIA UR QL AUTO: ABNORMAL /HPF
BASOPHILS # BLD AUTO: 0.07 THOUSANDS/ÂΜL (ref 0–0.1)
BASOPHILS NFR BLD AUTO: 1 % (ref 0–1)
BILIRUB SERPL-MCNC: 0.48 MG/DL (ref 0.2–1)
BILIRUB UR QL STRIP: NEGATIVE
BUN SERPL-MCNC: 16 MG/DL (ref 5–25)
CALCIUM SERPL-MCNC: 9.5 MG/DL (ref 8.4–10.2)
CHLORIDE SERPL-SCNC: 104 MMOL/L (ref 96–108)
CLARITY UR: CLEAR
CO2 SERPL-SCNC: 25 MMOL/L (ref 21–32)
COLOR UR: YELLOW
CREAT SERPL-MCNC: 0.89 MG/DL (ref 0.6–1.3)
CREAT UR-MCNC: 123.4 MG/DL
EOSINOPHIL # BLD AUTO: 0.19 THOUSAND/ÂΜL (ref 0–0.61)
EOSINOPHIL NFR BLD AUTO: 3 % (ref 0–6)
ERYTHROCYTE [DISTWIDTH] IN BLOOD BY AUTOMATED COUNT: 13.6 % (ref 11.6–15.1)
GFR SERPL CREATININE-BSD FRML MDRD: 92 ML/MIN/1.73SQ M
GLUCOSE P FAST SERPL-MCNC: 84 MG/DL (ref 65–99)
GLUCOSE UR STRIP-MCNC: ABNORMAL MG/DL
HCT VFR BLD AUTO: 48.4 % (ref 36.5–49.3)
HGB BLD-MCNC: 15.5 G/DL (ref 12–17)
HGB UR QL STRIP.AUTO: NEGATIVE
HYALINE CASTS #/AREA URNS LPF: ABNORMAL /LPF
IMM GRANULOCYTES # BLD AUTO: 0.03 THOUSAND/UL (ref 0–0.2)
IMM GRANULOCYTES NFR BLD AUTO: 0 % (ref 0–2)
KETONES UR STRIP-MCNC: NEGATIVE MG/DL
LEUKOCYTE ESTERASE UR QL STRIP: ABNORMAL
LYMPHOCYTES # BLD AUTO: 2.18 THOUSANDS/ÂΜL (ref 0.6–4.47)
LYMPHOCYTES NFR BLD AUTO: 29 % (ref 14–44)
MAGNESIUM SERPL-MCNC: 2.1 MG/DL (ref 1.9–2.7)
MCH RBC QN AUTO: 28 PG (ref 26.8–34.3)
MCHC RBC AUTO-ENTMCNC: 32 G/DL (ref 31.4–37.4)
MCV RBC AUTO: 87 FL (ref 82–98)
MICROALBUMIN UR-MCNC: 75.6 MG/L
MICROALBUMIN/CREAT 24H UR: 61 MG/G CREATININE (ref 0–30)
MONOCYTES # BLD AUTO: 0.55 THOUSAND/ÂΜL (ref 0.17–1.22)
MONOCYTES NFR BLD AUTO: 7 % (ref 4–12)
NEUTROPHILS # BLD AUTO: 4.61 THOUSANDS/ÂΜL (ref 1.85–7.62)
NEUTS SEG NFR BLD AUTO: 60 % (ref 43–75)
NITRITE UR QL STRIP: NEGATIVE
NON-SQ EPI CELLS URNS QL MICRO: ABNORMAL /HPF
NRBC BLD AUTO-RTO: 0 /100 WBCS
PH UR STRIP.AUTO: 6 [PH]
PHOSPHATE SERPL-MCNC: 3.3 MG/DL (ref 2.3–4.1)
PLATELET # BLD AUTO: 252 THOUSANDS/UL (ref 149–390)
PMV BLD AUTO: 9.3 FL (ref 8.9–12.7)
POTASSIUM SERPL-SCNC: 4.2 MMOL/L (ref 3.5–5.3)
PROT SERPL-MCNC: 7.4 G/DL (ref 6.4–8.4)
PROT UR STRIP-MCNC: NEGATIVE MG/DL
PTH-INTACT SERPL-MCNC: 33.2 PG/ML (ref 12–88)
RBC # BLD AUTO: 5.54 MILLION/UL (ref 3.88–5.62)
RBC #/AREA URNS AUTO: ABNORMAL /HPF
SODIUM SERPL-SCNC: 137 MMOL/L (ref 135–147)
SP GR UR STRIP.AUTO: 1.02 (ref 1–1.03)
UROBILINOGEN UR QL STRIP.AUTO: 0.2 E.U./DL
WBC # BLD AUTO: 7.63 THOUSAND/UL (ref 4.31–10.16)
WBC #/AREA URNS AUTO: ABNORMAL /HPF

## 2025-06-13 PROCEDURE — 83735 ASSAY OF MAGNESIUM: CPT

## 2025-06-13 PROCEDURE — 80053 COMPREHEN METABOLIC PANEL: CPT

## 2025-06-13 PROCEDURE — 85025 COMPLETE CBC W/AUTO DIFF WBC: CPT

## 2025-06-13 PROCEDURE — 83970 ASSAY OF PARATHORMONE: CPT

## 2025-06-13 PROCEDURE — 36415 COLL VENOUS BLD VENIPUNCTURE: CPT

## 2025-06-13 PROCEDURE — 82570 ASSAY OF URINE CREATININE: CPT

## 2025-06-13 PROCEDURE — 84100 ASSAY OF PHOSPHORUS: CPT

## 2025-06-13 PROCEDURE — 82043 UR ALBUMIN QUANTITATIVE: CPT

## 2025-06-13 PROCEDURE — 82306 VITAMIN D 25 HYDROXY: CPT

## 2025-06-13 PROCEDURE — 81001 URINALYSIS AUTO W/SCOPE: CPT

## 2025-06-16 ENCOUNTER — RESULTS FOLLOW-UP (OUTPATIENT)
Age: 60
End: 2025-06-16

## 2025-06-19 ENCOUNTER — OFFICE VISIT (OUTPATIENT)
Age: 60
End: 2025-06-19
Payer: COMMERCIAL

## 2025-06-19 VITALS
DIASTOLIC BLOOD PRESSURE: 72 MMHG | SYSTOLIC BLOOD PRESSURE: 106 MMHG | HEIGHT: 65 IN | OXYGEN SATURATION: 96 % | BODY MASS INDEX: 34.32 KG/M2 | TEMPERATURE: 97 F | WEIGHT: 206 LBS | HEART RATE: 96 BPM

## 2025-06-19 DIAGNOSIS — E11.22 TYPE 2 DIABETES MELLITUS WITH STAGE 2 CHRONIC KIDNEY DISEASE, WITHOUT LONG-TERM CURRENT USE OF INSULIN  (HCC): Primary | ICD-10-CM

## 2025-06-19 DIAGNOSIS — N18.2 TYPE 2 DIABETES MELLITUS WITH STAGE 2 CHRONIC KIDNEY DISEASE, WITHOUT LONG-TERM CURRENT USE OF INSULIN  (HCC): Primary | ICD-10-CM

## 2025-06-19 PROCEDURE — 99213 OFFICE O/P EST LOW 20 MIN: CPT

## 2025-06-19 NOTE — PROGRESS NOTES
Name: Michael Baeza      : 1965      MRN: [id number]  Encounter Provider: Lb Merida PA-C  Encounter Date: 2025   Encounter department: [facility]  :  Assessment & Plan  Type 2 diabetes mellitus with stage 2 chronic kidney disease, without long-term current use of insulin  ([facility])    Lab Results   Component Value Date    HGBA1C 6.8 (H) 2025   Baseline creatinine appears to be around 0.9 mg/dL  His most recent creatinine is right at this venu, 0.89 mg/dL  Etiology is thought to be due to diabetic nephropathy and he has been doing better with his hemoglobin A1c's   He has evidence of mildly increased albuminuria, 61 mg/g, and is on the appropriate therapy with Jardiance 10 mg daily and lisinopril 20 mg daily. Patient's blood pressure was noticed to be slightly low today, however, he is currently asymptomatic   His urinalysis did show evidence of glycosuria   No other acid/base/electrolytes abnormalities were noted   Routine screening kidney ultrasound showed small simple cysts, otherwise unremarkable kidneys   Orders:    Comprehensive metabolic panel; Future    Magnesium; Future    Phosphorus; Future    PTH, intact; Future    Urinalysis with microscopic; Future    CBC; Future    Albumin / creatinine urine ratio; Future        Patient Instructions   It was nice seeing you today. Your kidney function is stable. Please follow up with us in 12 months. I have ordered lab work for you to get done before then. In the meantime, please avoid NSAIDs and have a healthy diet and exercise.  Continue to focus on diabetes control. Continue to hydrate to 64 ounces daily    It was a pleasure evaluating your patient in the office today. Thank you for allowing our team to participate in the care of  Michael Baeza. Please do not hesitate to contact our team if further issues/questions shall arise in the interim.     History of Present Illness   HPI  Michael RODRIGUEZ  "Felisha is a 60 y.o. male who presents for nephrology follow up visit. Patient has had no recent hospitalizations or ER visits. No recent medication changes     Pertinent Medical History     Review of Systems   Constitutional:  Negative for chills and fever.   HENT:  Negative for ear pain and sore throat.    Eyes:  Negative for pain and visual disturbance.   Respiratory:  Negative for cough and shortness of breath.    Cardiovascular:  Negative for chest pain and palpitations.   Gastrointestinal:  Negative for abdominal pain and vomiting.   Genitourinary:  Negative for dysuria and hematuria.   Musculoskeletal:  Negative for arthralgias and back pain.   Skin:  Negative for color change and rash.   Neurological:  Negative for seizures and syncope.   All other systems reviewed and are negative.    Medical History Reviewed by provider this encounter:  Meds  Problems     .       Medications Ordered Prior to Encounter[1]  Objective   /72 (BP Location: Left arm, Patient Position: Sitting)   Pulse 96   Temp (!) 97 °F (36.1 °C)   Ht 5' 5\" (1.651 m)   Wt 93.4 kg (206 lb)   SpO2 96%   BMI 34.28 kg/m²      Physical Exam  Vitals and nursing note reviewed.   Constitutional:       General: He is not in acute distress.     Appearance: He is well-developed.   HENT:      Head: Normocephalic and atraumatic.     Eyes:      Conjunctiva/sclera: Conjunctivae normal.       Cardiovascular:      Rate and Rhythm: Normal rate and regular rhythm.      Heart sounds: No murmur heard.  Pulmonary:      Effort: Pulmonary effort is normal. No respiratory distress.      Breath sounds: Normal breath sounds.   Abdominal:      Palpations: Abdomen is soft.      Tenderness: There is no abdominal tenderness.     Musculoskeletal:         General: No swelling.      Cervical back: Neck supple.     Skin:     General: Skin is warm and dry.      Capillary Refill: Capillary refill takes less than 2 seconds.     Neurological:      Mental Status: He is " alert.     Psychiatric:         Mood and Affect: Mood normal.           Laboratory Results:  Results from last 7 days   Lab Units 06/13/25  1413   WBC Thousand/uL 7.63   HEMOGLOBIN g/dL 15.5   HEMATOCRIT % 48.4   PLATELETS Thousands/uL 252   POTASSIUM mmol/L 4.2   CHLORIDE mmol/L 104   CO2 mmol/L 25   BUN mg/dL 16   CREATININE mg/dL 0.89   CALCIUM mg/dL 9.5   MAGNESIUM mg/dL 2.1   PHOSPHORUS mg/dL 3.3       Results for orders placed or performed in visit on 06/13/25   Comprehensive metabolic panel   Result Value Ref Range    Sodium 137 135 - 147 mmol/L    Potassium 4.2 3.5 - 5.3 mmol/L    Chloride 104 96 - 108 mmol/L    CO2 25 21 - 32 mmol/L    ANION GAP 8 4 - 13 mmol/L    BUN 16 5 - 25 mg/dL    Creatinine 0.89 0.60 - 1.30 mg/dL    Glucose, Fasting 84 65 - 99 mg/dL    Calcium 9.5 8.4 - 10.2 mg/dL    AST 24 13 - 39 U/L    ALT 41 7 - 52 U/L    Alkaline Phosphatase 33 (L) 34 - 104 U/L    Total Protein 7.4 6.4 - 8.4 g/dL    Albumin 4.7 3.5 - 5.0 g/dL    Total Bilirubin 0.48 0.20 - 1.00 mg/dL    eGFR 92 ml/min/1.73sq m   CBC and differential   Result Value Ref Range    WBC 7.63 4.31 - 10.16 Thousand/uL    RBC 5.54 3.88 - 5.62 Million/uL    Hemoglobin 15.5 12.0 - 17.0 g/dL    Hematocrit 48.4 36.5 - 49.3 %    MCV 87 82 - 98 fL    MCH 28.0 26.8 - 34.3 pg    MCHC 32.0 31.4 - 37.4 g/dL    RDW 13.6 11.6 - 15.1 %    MPV 9.3 8.9 - 12.7 fL    Platelets 252 149 - 390 Thousands/uL    nRBC 0 /100 WBCs    Segmented % 60 43 - 75 %    Immature Grans % 0 0 - 2 %    Lymphocytes % 29 14 - 44 %    Monocytes % 7 4 - 12 %    Eosinophils Relative 3 0 - 6 %    Basophils Relative 1 0 - 1 %    Absolute Neutrophils 4.61 1.85 - 7.62 Thousands/µL    Absolute Immature Grans 0.03 0.00 - 0.20 Thousand/uL    Absolute Lymphocytes 2.18 0.60 - 4.47 Thousands/µL    Absolute Monocytes 0.55 0.17 - 1.22 Thousand/µL    Eosinophils Absolute 0.19 0.00 - 0.61 Thousand/µL    Basophils Absolute 0.07 0.00 - 0.10 Thousands/µL   Magnesium   Result Value Ref Range     Magnesium 2.1 1.9 - 2.7 mg/dL   Phosphorus   Result Value Ref Range    Phosphorus 3.3 2.3 - 4.1 mg/dL   PTH, intact   Result Value Ref Range    PTH 33.2 12.0 - 88.0 pg/mL   Vitamin D 25 hydroxy   Result Value Ref Range    Vit D, 25-Hydroxy 66.7 30.0 - 100.0 ng/mL   Albumin / creatinine urine ratio   Result Value Ref Range    Creatinine, Ur 123.4 Reference range not established. mg/dL    Albumin,U,Random 75.6 (H) <20.0 mg/L    Albumin Creat Ratio 61 (H) 0 - 30 mg/g creatinine   Urinalysis with microscopic   Result Value Ref Range    Color, UA Yellow     Clarity, UA Clear     Specific Gravity, UA 1.025 1.003 - 1.030    pH, UA 6.0 4.5, 5.0, 5.5, 6.0, 6.5, 7.0, 7.5, 8.0    Leukocytes, UA (A) Negative     Elevated glucose may cause decreased leukocyte values. See urine microscopic for UWBC result    Nitrite, UA Negative Negative    Protein, UA Negative Negative mg/dl    Glucose, UA >=1000 (1%) (A) Negative mg/dl    Ketones, UA Negative Negative mg/dl    Urobilinogen, UA 0.2 0.2, 1.0 E.U./dl E.U./dl    Bilirubin, UA Negative Negative    Occult Blood, UA Negative Negative    RBC, UA 0-1 (A) None Seen, 2-4 /hpf    WBC, UA 1-2 (A) None Seen, 2-4, 5-60 /hpf    Epithelial Cells Occasional None Seen, Occasional /hpf    Bacteria, UA Occasional None Seen, Occasional /hpf    Hyaline Casts, UA 0-1 (A) (none) /lpf                [1]   Current Outpatient Medications on File Prior to Visit   Medication Sig Dispense Refill    Apple Cider Vinegar 500 MG TABS Take 450 mg by mouth      Cholecalciferol (Vitamin D-3) 125 MCG (5000 UT) TABS Take by mouth      Empagliflozin (Jardiance) 10 MG TABS tablet Take 1 tablet (10 mg total) by mouth every morning 90 tablet 3    Garlic 2000 MG TBEC Take 2,000 mg by mouth      glipiZIDE (GLUCOTROL XL) 10 mg 24 hr tablet Take 10 mg by mouth in the morning.      lisinopril (ZESTRIL) 20 mg tablet Take 20 mg by mouth in the morning.      Melatonin 10 MG TABS Take 10 mg by mouth      metFORMIN (GLUCOPHAGE)  1000 MG tablet Take 1,000 mg by mouth in the morning and 1,000 mg in the evening. Take with meals.      Multiple Vitamins-Minerals (multivitamin with minerals) tablet Take 1 tablet by mouth in the morning.      Omega-3 Fatty Acids (Fish Oil) 1200 MG CAPS Take 1,200 mg by mouth      semaglutide, 0.25 or 0.5 mg/dose, (Ozempic, 0.25 or 0.5 MG/DOSE,) 2 mg/3 mL injection pen INJECT 3/4 (THREE-FOURTHS )ML SUBCUTANEOUSLY  ONCE A WEEK (Patient taking differently: 0.5 mg every 7 days) 3 mL 5    simvastatin (ZOCOR) 20 mg tablet Take 20 mg by mouth daily at bedtime       No current facility-administered medications on file prior to visit.

## 2025-06-19 NOTE — PATIENT INSTRUCTIONS
It was nice seeing you today. Your kidney function is stable. Please follow up with us in 12 months. I have ordered lab work for you to get done before then. In the meantime, please avoid NSAIDs and have a healthy diet and exercise.  Continue to focus on diabetes control. Continue to hydrate to 64 ounces daily

## 2025-06-20 ENCOUNTER — TELEPHONE (OUTPATIENT)
Age: 60
End: 2025-06-20

## 2025-06-20 NOTE — TELEPHONE ENCOUNTER
Patients wife called stating at his appointment yesterday he paid cash for his appointment. She reports that they never got a receipt from the office. They are asking if a receipt for cash payment can please be mailed to their home address

## 2025-07-30 ENCOUNTER — TELEPHONE (OUTPATIENT)
Age: 60
End: 2025-07-30